# Patient Record
Sex: FEMALE | Race: WHITE | Employment: UNEMPLOYED | URBAN - METROPOLITAN AREA
[De-identification: names, ages, dates, MRNs, and addresses within clinical notes are randomized per-mention and may not be internally consistent; named-entity substitution may affect disease eponyms.]

---

## 2017-06-02 DIAGNOSIS — F41.9 ANXIETY: ICD-10-CM

## 2017-06-02 DIAGNOSIS — E78.2 MIXED HYPERLIPIDEMIA: ICD-10-CM

## 2017-06-02 DIAGNOSIS — I10 ESSENTIAL HYPERTENSION: ICD-10-CM

## 2017-06-02 RX ORDER — LORAZEPAM 0.5 MG/1
0.5 TABLET ORAL NIGHTLY
COMMUNITY
End: 2017-08-14 | Stop reason: SDUPTHER

## 2017-06-02 RX ORDER — NITROGLYCERIN 0.4 MG/1
0.4 TABLET SUBLINGUAL EVERY 5 MIN PRN
COMMUNITY
End: 2017-10-10 | Stop reason: SDUPTHER

## 2017-06-02 RX ORDER — ATORVASTATIN CALCIUM 10 MG/1
10 TABLET, FILM COATED ORAL NIGHTLY
COMMUNITY
End: 2017-10-10 | Stop reason: SDUPTHER

## 2017-06-02 RX ORDER — NEBIVOLOL 10 MG/1
10 TABLET ORAL EVERY EVENING
COMMUNITY
End: 2017-10-10 | Stop reason: SDUPTHER

## 2017-06-02 RX ORDER — LISINOPRIL 40 MG/1
40 TABLET ORAL DAILY
COMMUNITY
End: 2017-09-30 | Stop reason: DRUGHIGH

## 2017-06-02 RX ORDER — LEVOTHYROXINE SODIUM 0.03 MG/1
TABLET ORAL
COMMUNITY
End: 2017-10-10 | Stop reason: SDUPTHER

## 2017-06-02 RX ORDER — DONEPEZIL HYDROCHLORIDE 5 MG/1
5 TABLET, FILM COATED ORAL NIGHTLY
COMMUNITY
End: 2017-10-10 | Stop reason: SDUPTHER

## 2017-06-02 RX ORDER — MEMANTINE HYDROCHLORIDE 14 MG/1
14 CAPSULE, EXTENDED RELEASE ORAL EVERY EVENING
COMMUNITY
End: 2017-10-10 | Stop reason: SDUPTHER

## 2017-06-02 RX ORDER — AMLODIPINE BESYLATE 5 MG/1
5 TABLET ORAL 2 TIMES DAILY
COMMUNITY
End: 2017-10-10 | Stop reason: SDUPTHER

## 2017-06-02 RX ORDER — MULTIVITAMIN
1 TABLET ORAL DAILY
COMMUNITY
End: 2017-10-10 | Stop reason: SDUPTHER

## 2017-06-03 ENCOUNTER — OFFICE VISIT (OUTPATIENT)
Dept: FAMILY MEDICINE CLINIC | Age: 82
End: 2017-06-03

## 2017-06-03 VITALS
RESPIRATION RATE: 16 BRPM | HEART RATE: 80 BPM | BODY MASS INDEX: 23.3 KG/M2 | DIASTOLIC BLOOD PRESSURE: 68 MMHG | WEIGHT: 108 LBS | TEMPERATURE: 98.7 F | SYSTOLIC BLOOD PRESSURE: 120 MMHG | HEIGHT: 57 IN

## 2017-06-03 DIAGNOSIS — M79.605 LEFT LEG PAIN: ICD-10-CM

## 2017-06-03 DIAGNOSIS — G89.29 CHRONIC PAIN OF BOTH KNEES: ICD-10-CM

## 2017-06-03 DIAGNOSIS — F02.80 LATE ONSET ALZHEIMER'S DISEASE WITHOUT BEHAVIORAL DISTURBANCE (HCC): ICD-10-CM

## 2017-06-03 DIAGNOSIS — E78.2 MIXED HYPERLIPIDEMIA: ICD-10-CM

## 2017-06-03 DIAGNOSIS — Z91.81 AT HIGH RISK FOR FALLS: ICD-10-CM

## 2017-06-03 DIAGNOSIS — I25.10 CORONARY ARTERY DISEASE INVOLVING NATIVE CORONARY ARTERY OF NATIVE HEART WITHOUT ANGINA PECTORIS: ICD-10-CM

## 2017-06-03 DIAGNOSIS — M25.561 CHRONIC PAIN OF BOTH KNEES: ICD-10-CM

## 2017-06-03 DIAGNOSIS — G30.1 LATE ONSET ALZHEIMER'S DISEASE WITHOUT BEHAVIORAL DISTURBANCE (HCC): ICD-10-CM

## 2017-06-03 DIAGNOSIS — Z13.31 POSITIVE DEPRESSION SCREENING: ICD-10-CM

## 2017-06-03 DIAGNOSIS — F41.1 GENERALIZED ANXIETY DISORDER: ICD-10-CM

## 2017-06-03 DIAGNOSIS — I10 ESSENTIAL HYPERTENSION: Primary | ICD-10-CM

## 2017-06-03 DIAGNOSIS — E03.9 ACQUIRED HYPOTHYROIDISM: ICD-10-CM

## 2017-06-03 DIAGNOSIS — M25.562 CHRONIC PAIN OF BOTH KNEES: ICD-10-CM

## 2017-06-03 PROCEDURE — 99204 OFFICE O/P NEW MOD 45 MIN: CPT | Performed by: FAMILY MEDICINE

## 2017-06-03 PROCEDURE — 3288F FALL RISK ASSESSMENT DOCD: CPT | Performed by: FAMILY MEDICINE

## 2017-06-03 PROCEDURE — G8431 POS CLIN DEPRES SCRN F/U DOC: HCPCS | Performed by: FAMILY MEDICINE

## 2017-06-03 ASSESSMENT — PATIENT HEALTH QUESTIONNAIRE - PHQ9
3. TROUBLE FALLING OR STAYING ASLEEP: 0
SUM OF ALL RESPONSES TO PHQ9 QUESTIONS 1 & 2: 4
1. LITTLE INTEREST OR PLEASURE IN DOING THINGS: 2
10. IF YOU CHECKED OFF ANY PROBLEMS, HOW DIFFICULT HAVE THESE PROBLEMS MADE IT FOR YOU TO DO YOUR WORK, TAKE CARE OF THINGS AT HOME, OR GET ALONG WITH OTHER PEOPLE: 0
5. POOR APPETITE OR OVEREATING: 0
6. FEELING BAD ABOUT YOURSELF - OR THAT YOU ARE A FAILURE OR HAVE LET YOURSELF OR YOUR FAMILY DOWN: 1
7. TROUBLE CONCENTRATING ON THINGS, SUCH AS READING THE NEWSPAPER OR WATCHING TELEVISION: 1
8. MOVING OR SPEAKING SO SLOWLY THAT OTHER PEOPLE COULD HAVE NOTICED. OR THE OPPOSITE, BEING SO FIGETY OR RESTLESS THAT YOU HAVE BEEN MOVING AROUND A LOT MORE THAN USUAL: 0
2. FEELING DOWN, DEPRESSED OR HOPELESS: 2
SUM OF ALL RESPONSES TO PHQ QUESTIONS 1-9: 8
4. FEELING TIRED OR HAVING LITTLE ENERGY: 2
9. THOUGHTS THAT YOU WOULD BE BETTER OFF DEAD, OR OF HURTING YOURSELF: 0

## 2017-06-18 LAB
BACTERIA: ABNORMAL /HPF
BILIRUBIN URINE: NEGATIVE
BLOOD, URINE: ABNORMAL
CLARITY: CLEAR
COLOR: YELLOW
CRYSTALS, UA: ABNORMAL
EPITHELIAL CELLS, UA: ABNORMAL /HPF
GLUCOSE URINE: NEGATIVE MG/DL
KETONES, URINE: NEGATIVE MG/DL
LEUKOCYTE ESTERASE, URINE: NEGATIVE
MUCUS: PRESENT
NITRITE, URINE: NEGATIVE
PH UA: 6 (ref 5–9)
PROTEIN UA: >=300 MG/DL
RBC UA: ABNORMAL /HPF (ref 0–2)
SPECIFIC GRAVITY UA: 1.03 (ref 1–1.03)
UROBILINOGEN, URINE: 1 E.U./DL
WBC UA: ABNORMAL /HPF (ref 0–5)

## 2017-06-19 DIAGNOSIS — R80.9 PROTEINURIA: ICD-10-CM

## 2017-06-19 DIAGNOSIS — R31.9 HEMATURIA: Primary | ICD-10-CM

## 2017-06-19 LAB — URINE CULTURE, ROUTINE: NORMAL

## 2017-06-23 DIAGNOSIS — R31.9 HEMATURIA: ICD-10-CM

## 2017-06-23 DIAGNOSIS — R80.9 PROTEINURIA: ICD-10-CM

## 2017-07-26 ENCOUNTER — TELEPHONE (OUTPATIENT)
Dept: FAMILY MEDICINE CLINIC | Age: 82
End: 2017-07-26

## 2017-08-14 RX ORDER — LORAZEPAM 0.5 MG/1
0.5 TABLET ORAL NIGHTLY PRN
Qty: 90 TABLET | Refills: 0 | Status: SHIPPED | OUTPATIENT
Start: 2017-08-14 | End: 2017-10-10 | Stop reason: SDUPTHER

## 2017-08-23 ENCOUNTER — TELEPHONE (OUTPATIENT)
Dept: FAMILY MEDICINE CLINIC | Age: 82
End: 2017-08-23

## 2017-09-19 LAB
BASOPHILS ABSOLUTE: 0 K/UL (ref 0–0.2)
BASOPHILS ABSOLUTE: NORMAL /ΜL
BASOPHILS RELATIVE PERCENT: 0.5 %
BASOPHILS RELATIVE PERCENT: NORMAL %
EOSINOPHILS ABSOLUTE: 0.2 K/UL (ref 0–0.7)
EOSINOPHILS ABSOLUTE: NORMAL /ΜL
EOSINOPHILS RELATIVE PERCENT: 3.3 %
EOSINOPHILS RELATIVE PERCENT: NORMAL %
HCT VFR BLD CALC: 37.5 % (ref 37–47)
HCT VFR BLD CALC: NORMAL % (ref 36–46)
HEMOGLOBIN: 12.1 G/DL (ref 12–16)
HEMOGLOBIN: NORMAL G/DL (ref 12–16)
LYMPHOCYTES ABSOLUTE: 1.8 K/UL (ref 1–4.8)
LYMPHOCYTES ABSOLUTE: NORMAL /ΜL
LYMPHOCYTES RELATIVE PERCENT: 29.9 %
LYMPHOCYTES RELATIVE PERCENT: NORMAL %
MCH RBC QN AUTO: 29.2 PG (ref 27–31.3)
MCH RBC QN AUTO: NORMAL PG
MCHC RBC AUTO-ENTMCNC: 32.2 % (ref 33–37)
MCHC RBC AUTO-ENTMCNC: NORMAL G/DL
MCV RBC AUTO: 90.6 FL (ref 82–100)
MCV RBC AUTO: NORMAL FL
MONOCYTES ABSOLUTE: 0.6 K/UL (ref 0.2–0.8)
MONOCYTES ABSOLUTE: NORMAL /ΜL
MONOCYTES RELATIVE PERCENT: 10.1 %
MONOCYTES RELATIVE PERCENT: NORMAL %
NEUTROPHILS ABSOLUTE: 3.4 K/UL (ref 1.4–6.5)
NEUTROPHILS ABSOLUTE: NORMAL /ΜL
NEUTROPHILS RELATIVE PERCENT: 56.2 %
NEUTROPHILS RELATIVE PERCENT: NORMAL %
PDW BLD-RTO: 15.1 % (ref 11.5–14.5)
PDW BLD-RTO: NORMAL %
PLATELET # BLD: 268 K/UL (ref 130–400)
PLATELET # BLD: NORMAL K/ΜL
PMV BLD AUTO: NORMAL FL
RBC # BLD: 4.14 M/UL (ref 4.2–5.4)
RBC # BLD: NORMAL 10^6/ΜL
WBC # BLD: 6 K/UL (ref 4.8–10.8)
WBC # BLD: NORMAL 10^3/ML

## 2017-09-27 ENCOUNTER — TELEPHONE (OUTPATIENT)
Dept: FAMILY MEDICINE CLINIC | Age: 82
End: 2017-09-27

## 2017-09-30 ENCOUNTER — OFFICE VISIT (OUTPATIENT)
Dept: FAMILY MEDICINE CLINIC | Age: 82
End: 2017-09-30

## 2017-09-30 DIAGNOSIS — Z23 NEED FOR PNEUMOCOCCAL VACCINATION: ICD-10-CM

## 2017-09-30 DIAGNOSIS — G30.1 LATE ONSET ALZHEIMER'S DISEASE WITHOUT BEHAVIORAL DISTURBANCE (HCC): ICD-10-CM

## 2017-09-30 DIAGNOSIS — I10 ESSENTIAL HYPERTENSION: Primary | ICD-10-CM

## 2017-09-30 DIAGNOSIS — E78.2 MIXED HYPERLIPIDEMIA: ICD-10-CM

## 2017-09-30 DIAGNOSIS — Z23 NEED FOR INFLUENZA VACCINATION: ICD-10-CM

## 2017-09-30 DIAGNOSIS — E03.9 ACQUIRED HYPOTHYROIDISM: ICD-10-CM

## 2017-09-30 DIAGNOSIS — F02.80 LATE ONSET ALZHEIMER'S DISEASE WITHOUT BEHAVIORAL DISTURBANCE (HCC): ICD-10-CM

## 2017-09-30 PROCEDURE — 90670 PCV13 VACCINE IM: CPT | Performed by: FAMILY MEDICINE

## 2017-09-30 PROCEDURE — G0009 ADMIN PNEUMOCOCCAL VACCINE: HCPCS | Performed by: FAMILY MEDICINE

## 2017-09-30 PROCEDURE — G0008 ADMIN INFLUENZA VIRUS VAC: HCPCS | Performed by: FAMILY MEDICINE

## 2017-09-30 PROCEDURE — 90662 IIV NO PRSV INCREASED AG IM: CPT | Performed by: FAMILY MEDICINE

## 2017-09-30 PROCEDURE — 99214 OFFICE O/P EST MOD 30 MIN: CPT | Performed by: FAMILY MEDICINE

## 2017-09-30 RX ORDER — LISINOPRIL 20 MG/1
20 TABLET ORAL DAILY
Qty: 90 TABLET | Refills: 3 | Status: SHIPPED | OUTPATIENT
Start: 2017-09-30 | End: 2017-10-10 | Stop reason: SDUPTHER

## 2017-09-30 ASSESSMENT — PATIENT HEALTH QUESTIONNAIRE - PHQ9
SUM OF ALL RESPONSES TO PHQ9 QUESTIONS 1 & 2: 0
1. LITTLE INTEREST OR PLEASURE IN DOING THINGS: 0
2. FEELING DOWN, DEPRESSED OR HOPELESS: 0
SUM OF ALL RESPONSES TO PHQ QUESTIONS 1-9: 0

## 2017-09-30 NOTE — PROGRESS NOTES
Subjective:       Chief Complaint   Patient presents with    Follow-up     htn, hyperlipidemia, and other medical conditions      Kati Montana is a 80 y.o. female who presents today for FU of Follow-up (htn, hyperlipidemia, and other medical conditions)    She presents for follow up on hypertension,  Hyperlipidemia,Hypothyroidism and other medical conditions noted below. She indicates that she is feeling well and denies any symptoms referable to her elevated blood pressure. Specifically denies chest pain, palpitations, dyspnea, orthopnea, PND or peripheral edema. No anorexia, arthralgia, or leg cramps noted. Current medication regimen is as listed below. She denies any side effects of medication, and has been taking it regularly.       Past Medical History:   Diagnosis Date    Alzheimer's dementia     Anxiety     CAD (coronary artery disease)     Dementia     Hyperlipidemia     Hypertension     Hypothyroidism     Melanoma ([facility] [address]     skin    Osteoarthritis      Patient Active Problem List    Diagnosis Date Noted    Late onset Alzheimer's disease without behavioral disturbance 06/03/2017    Generalized anxiety disorder 06/03/2017    Hypothyroidism     Osteoarthritis     CAD (coronary artery disease)     Hyperlipidemia     Hypertension      Past Surgical History:   Procedure Laterality Date    BACK SURGERY      BREAST SURGERY      biopsy    CORONARY ANGIOPLASTY WITH STENT PLACEMENT      HYSTERECTOMY  1960     Family History   Problem Relation Age of Onset    Cancer Brother      Social History     Social History    Marital status:      Spouse name: N/A    Number of children: N/A    Years of education: N/A     Social History Main Topics    Smoking status: Never Smoker    Smokeless tobacco: Never Used    Alcohol use No    Drug use: No    Sexual activity: Not Asked     Other Topics Concern    None     Social History Narrative     Current Outpatient Prescriptions   Medication Sig (CALTRATE 600 PLUS-VIT D PO) Take 1 tablet by mouth daily      memantine ER (NAMENDA XR) 14 MG CP24 extended release capsule Take 14 mg by mouth every evening      nebivolol (BYSTOLIC) 10 MG tablet Take 10 mg by mouth every evening      aspirin 81 MG tablet Take 81 mg by mouth daily      atorvastatin (LIPITOR) 10 MG tablet Take 10 mg by mouth nightly      donepezil (ARICEPT) 5 MG tablet Take 5 mg by mouth nightly      nitroGLYCERIN (NITROSTAT) 0.4 MG SL tablet Place 0.4 mg under the tongue every 5 minutes as needed for Chest pain up to max of 3 total doses. If no relief after 1 dose, call 911.  Calcium Carbonate Antacid (TUMS ULTRA 1000) 1000 MG CHEW Take 2 tablets by mouth as needed       No current facility-administered medications on file prior to visit.       Allergies   Allergen Reactions    Amoxil [Amoxicillin]     Calcium Citrate     Noroxin [Norfloxacin]     Tiazac [Diltiazem]           Review of Systems - General ROS: negative for - fatigue, fever, malaise, night sweats, sleep disturbance or weight loss  Psychological ROS: negative for - anxiety, concentration difficulties, depression, memory difficulties or sleep disturbances  ENT ROS: negative for - hearing change, nasal discharge, oral lesions, sinus pain, sore throat, tinnitus or vertigo  Allergy and Immunology ROS: negative for - hives, nasal congestion or seasonal allergies  Hematological and Lymphatic ROS: negative for - bruising, fatigue, night sweats or pallor  Endocrine ROS: negative for - hot flashes, malaise/lethargy, palpitations, polydipsia/polyuria, skin changes, temperature intolerance or unexpected weight changes  Respiratory ROS: negative for - cough, hemoptysis, pleuritic pain, shortness of breath or wheezing  Cardiovascular ROS: no chest pain or dyspnea on exertion  Gastrointestinal ROS: no abdominal pain, change in bowel habits, or black or bloody stools  Genito-Urinary ROS: no dysuria, trouble voiding, or hematuria  Musculoskeletal ROS: negative for - joint pain, joint stiffness, joint swelling, muscle pain or muscular weakness  Neurological ROS: negative for - dizziness, gait disturbance, headaches, impaired coordination/balance, numbness/tingling, tremors or visual changes  Dermatological ROS: negative for - dry skin, lumps, pruritus or rash        Objective:   Blood pressure 122/62, pulse 62, temperature 97.2 °F (36.2 °C), temperature source Temporal, resp. rate 14, height 4' 8.75\" (1.441 m), weight 102 lb 9.6 oz (46.5 kg). Physical Examination: General appearance - alert, well appearing, and in no distress  Mental status - alert, oriented to person, place, and time  Eyes - pupils equal and reactive, extraocular eye movements intact  Ears - bilateral TM's and external ear canals normal  Mouth - mucous membranes moist, pharynx normal without lesions  Neck - supple, no significant adenopathy  Lymphatics - no palpable lymphadenopathy, no hepatosplenomegaly  Chest - clear to auscultation, no wheezes, rales or rhonchi, symmetric air entry  Heart - normal rate, regular rhythm, normal S1, S2, no murmurs, rubs, clicks or gallops  Abdomen - soft, nontender, nondistended, no masses or organomegaly  Neurological - alert, oriented, normal speech, no focal findings or movement disorder noted  Musculoskeletal - no joint tenderness, deformity or swelling  Extremities: peripheral pulses normal, no pedal edema, no clubbing or cyanosis. Assessment / Plan:     Encounter Diagnoses   Name Primary?     Mixed hyperlipidemia     Essential hypertension Yes    Acquired hypothyroidism     Need for pneumococcal vaccination     Need for influenza vaccination     Late onset Alzheimer's disease without behavioral disturbance        Orders Placed This Encounter   Procedures    INFLUENZA, HIGH DOSE, 65 YRS +, IM, PF, PREFILL SYR, 0.5ML (FLUZONE HD)    Pneumococcal conjugate vaccine 13-valent IM (PREVNAR 13)    Lipid Panel Standing Status:   Future     Standing Expiration Date:   10/1/2018     Order Specific Question:   Is Patient Fasting?/# of Hours     Answer:   8-10    Comprehensive Metabolic Panel     Standing Status:   Future     Standing Expiration Date:   10/1/2018    T4, Free     Standing Status:   Future     Standing Expiration Date:   10/1/2018    TSH without Reflex     Standing Status:   Future     Standing Expiration Date:   9/30/2018       Outpatient Encounter Prescriptions as of 9/30/2017   Medication Sig Dispense Refill    lisinopril (PRINIVIL;ZESTRIL) 20 MG tablet Take 1 tablet by mouth daily 90 tablet 3    LORazepam (ATIVAN) 0.5 MG tablet Take 1 tablet by mouth nightly as needed for Anxiety 90 tablet 0    levothyroxine (SYNTHROID) 25 MCG tablet Take 1 tab by mouth Monday, and 1/2 tab on Sun, Tues, Weds, Thurs, Fri and Saturday      amLODIPine (NORVASC) 5 MG tablet Take 5 mg by mouth 2 times daily      Multiple Vitamin (MULTIVITAMIN) tablet Take 1 tablet by mouth daily      vitamin D (CHOLECALCIFEROL) 1000 UNIT TABS tablet Take 1,000 Units by mouth daily      Calcium-Vitamin D (CALTRATE 600 PLUS-VIT D PO) Take 1 tablet by mouth daily      memantine ER (NAMENDA XR) 14 MG CP24 extended release capsule Take 14 mg by mouth every evening      nebivolol (BYSTOLIC) 10 MG tablet Take 10 mg by mouth every evening      aspirin 81 MG tablet Take 81 mg by mouth daily      atorvastatin (LIPITOR) 10 MG tablet Take 10 mg by mouth nightly      donepezil (ARICEPT) 5 MG tablet Take 5 mg by mouth nightly      nitroGLYCERIN (NITROSTAT) 0.4 MG SL tablet Place 0.4 mg under the tongue every 5 minutes as needed for Chest pain up to max of 3 total doses. If no relief after 1 dose, call 911.       Calcium Carbonate Antacid (TUMS ULTRA 1000) 1000 MG CHEW Take 2 tablets by mouth as needed      [DISCONTINUED] lisinopril (PRINIVIL;ZESTRIL) 40 MG tablet Take 40 mg by mouth daily       No facility-administered encounter medications

## 2017-09-30 NOTE — PATIENT INSTRUCTIONS
High Cholesterol: Care Instructions  Your Care Instructions  Cholesterol is a type of fat in your blood. It is needed for many body functions, such as making new cells. Cholesterol is made by your body. It also comes from food you eat. High cholesterol means that you have too much of the fat in your blood. This raises your risk of a heart attack and stroke. LDL and HDL are part of your total cholesterol. LDL is the \"bad\" cholesterol. High LDL can raise your risk for heart disease, heart attack, and stroke. HDL is the \"good\" cholesterol. It helps clear bad cholesterol from the body. High HDL is linked with a lower risk of heart disease, heart attack, and stroke. Your cholesterol levels help your doctor find out your risk for having a heart attack or stroke. You and your doctor can talk about whether you need to lower your risk and what treatment is best for you. A heart-healthy lifestyle along with medicines can help lower your cholesterol and your risk. The way you choose to lower your risk will depend on how high your risk is for heart attack and stroke. It will also depend on how you feel about taking medicines. Follow-up care is a key part of your treatment and safety. Be sure to make and go to all appointments, and call your doctor if you are having problems. It's also a good idea to know your test results and keep a list of the medicines you take. How can you care for yourself at home? · Eat a variety of foods every day. Good choices include fruits, vegetables, whole grains (like oatmeal), dried beans and peas, nuts and seeds, soy products (like tofu), and fat-free or low-fat dairy products. · Replace butter, margarine, and hydrogenated or partially hydrogenated oils with olive and canola oils. (Canola oil margarine without trans fat is fine.)  · Replace red meat with fish, poultry, and soy protein (like tofu). · Limit processed and packaged foods like chips, crackers, and cookies.   · Bake, broil,

## 2017-09-30 NOTE — MR AVS SNAPSHOT
After Visit Summary             Unique Antoine   2017 9:30 AM   Office Visit    Description:  Female : 1931   Provider:  Jd Betancur MD   Department:  [facility]              Your Follow-Up and Future Appointments         Below is a list of your follow-up and future appointments. This may not be a complete list as you may have made appointments directly with providers that we are not aware of or your providers may have made some for you. Please call your providers to confirm appointments. It is important to keep your appointments. Please bring your current insurance card, photo ID, co-pay, and all medication bottles to your appointment. If self-pay, payment is expected at the time of service. Your To-Do List     Future Orders Complete By Expires    Comprehensive Metabolic Panel [TUT94 Custom]  2017 (Approximate) 10/1/2018    Lipid Panel [LAB18 Custom]  2017 (Approximate) 10/1/2018    T4, Free [BVZ285 Custom]  2017 (Approximate) 10/1/2018    TSH without Reflex [LWR109 Custom]  2017 (Approximate) 2018         Information from Your Visit        Department     Name Address Phone Fax    [facility] [address] [place] [address]. [place]enda [place] [address] [phone]      You Were Seen for:         Comments    Essential hypertension   [658342]         Vital Signs     Blood Pressure Pulse Temperature Respirations Height Weight    122/52 (Site: Right Arm, Position: Sitting, Cuff Size: Medium Adult) 62 97.2 °F (36.2 °C) (Temporal) 14 4' 8.75\" (1.441 m) 102 lb 9.6 oz (46.5 kg)    Body Mass Index Smoking Status                22.4 kg/m2 Never Smoker          Instructions         High Cholesterol: Care Instructions  Your Care Instructions  Cholesterol is a type of fat in your blood. It is needed for many body functions, such as making new cells. Cholesterol is made by your body. It also comes from food you eat.  High cholesterol means that you have too much of the fat in your blood. This raises your risk of a heart attack and stroke. LDL and HDL are part of your total cholesterol. LDL is the \"bad\" cholesterol. High LDL can raise your risk for heart disease, heart attack, and stroke. HDL is the \"good\" cholesterol. It helps clear bad cholesterol from the body. High HDL is linked with a lower risk of heart disease, heart attack, and stroke. Your cholesterol levels help your doctor find out your risk for having a heart attack or stroke. You and your doctor can talk about whether you need to lower your risk and what treatment is best for you. A heart-healthy lifestyle along with medicines can help lower your cholesterol and your risk. The way you choose to lower your risk will depend on how high your risk is for heart attack and stroke. It will also depend on how you feel about taking medicines. Follow-up care is a key part of your treatment and safety. Be sure to make and go to all appointments, and call your doctor if you are having problems. It's also a good idea to know your test results and keep a list of the medicines you take. How can you care for yourself at home? · Eat a variety of foods every day. Good choices include fruits, vegetables, whole grains (like oatmeal), dried beans and peas, nuts and seeds, soy products (like tofu), and fat-free or low-fat dairy products. · Replace butter, margarine, and hydrogenated or partially hydrogenated oils with olive and canola oils. (Canola oil margarine without trans fat is fine.)  · Replace red meat with fish, poultry, and soy protein (like tofu). · Limit processed and packaged foods like chips, crackers, and cookies. · Bake, broil, or steam foods. Don't sun them. · Be physically active. Get at least 30 minutes of exercise on most days of the week. Walking is a good choice. You also may want to do other activities, such as running, swimming, cycling, or playing tennis or team sports. · Stay at a healthy weight or lose weight by making the changes in eating and physical activity listed above. Losing just a small amount of weight, even 5 to 10 pounds, can reduce your risk for having a heart attack or stroke. · Do not smoke. When should you call for help? Watch closely for changes in your health, and be sure to contact your doctor if:  · You need help making lifestyle changes. · You have questions about your medicine. Where can you learn more? Go to https://[email or web address]. org and sign in to your [employer] account. Enter T908 in the Feedback box to learn more about \"High Cholesterol: Care Instructions. \"     If you do not have an account, please click on the \"Sign Up Now\" link. Current as of: April 3, 2017  Content Version: 11.3  © 9974-8567 [employer]. Care instructions adapted under license by [facility] ([facility]). If you have questions about a medical condition or this instruction, always ask your healthcare professional. [place] [address] any warranty or liability for your use of this information. Today's Medication Changes          These changes are accurate as of: 9/30/17  9:58 AM.  If you have any questions, ask your nurse or doctor. START taking these medications           lisinopril 20 MG tablet   Commonly known as:  PRINIVIL;ZESTRIL   Instructions:   Take 1 tablet by mouth daily   Quantity:  90 tablet   Refills:  3   Started by:  Ruth Santiago MD            Where to Get Your Medications      These medications were sent to [address] [place] [address] [address], [address] [address]     Phone:  [phone]     lisinopril 20 MG tablet               Your Current Medications Are              lisinopril (PRINIVIL;ZESTRIL) 20 MG tablet Take 1 tablet by mouth daily Pneumococcal Vaccines (two) for all adults aged 72 and over (1 of 2 - PCV13) 2/9/1996    Yearly Flu Vaccine (1) 9/1/2017    Zoster Vaccine 9/30/2018 (Originally 2/9/1991)    Tetanus Combination Vaccine (1 - Tdap) 9/30/2018 (Originally 2[birth year]9/1950)            [address] [place] [address] records indicate that you have declined MyChart signup.

## 2017-10-01 VITALS
BODY MASS INDEX: 22.14 KG/M2 | HEART RATE: 62 BPM | TEMPERATURE: 97.2 F | RESPIRATION RATE: 14 BRPM | HEIGHT: 57 IN | WEIGHT: 102.6 LBS | DIASTOLIC BLOOD PRESSURE: 62 MMHG | SYSTOLIC BLOOD PRESSURE: 122 MMHG

## 2017-10-10 RX ORDER — MEMANTINE HYDROCHLORIDE 14 MG/1
14 CAPSULE, EXTENDED RELEASE ORAL DAILY
COMMUNITY

## 2017-10-10 RX ORDER — DONEPEZIL HYDROCHLORIDE 5 MG/1
5 TABLET, FILM COATED ORAL NIGHTLY
COMMUNITY

## 2017-10-10 RX ORDER — ATORVASTATIN CALCIUM 10 MG/1
10 TABLET, FILM COATED ORAL DAILY
COMMUNITY

## 2017-10-10 RX ORDER — MULTIVIT WITH MINERALS/LUTEIN
1 TABLET ORAL DAILY
COMMUNITY

## 2017-10-10 RX ORDER — NITROGLYCERIN 0.4 MG/1
0.4 TABLET SUBLINGUAL EVERY 5 MIN PRN
COMMUNITY

## 2017-10-10 RX ORDER — AMLODIPINE BESYLATE 5 MG/1
5 TABLET ORAL DAILY
COMMUNITY
End: 2022-11-01 | Stop reason: SDUPTHER

## 2017-10-10 RX ORDER — ACETAMINOPHEN 325 MG/1
650 TABLET ORAL DAILY PRN
COMMUNITY
End: 2023-04-17

## 2017-10-10 RX ORDER — ACETAMINOPHEN 325 MG/1
650 TABLET ORAL 3 TIMES DAILY
COMMUNITY

## 2017-10-10 RX ORDER — NEBIVOLOL 10 MG/1
10 TABLET ORAL DAILY
COMMUNITY
End: 2022-10-11 | Stop reason: SDUPTHER

## 2017-10-10 RX ORDER — LEVOTHYROXINE SODIUM 0.03 MG/1
25 TABLET ORAL SEE ADMIN INSTRUCTIONS
COMMUNITY

## 2017-10-10 RX ORDER — LISINOPRIL 40 MG/1
20 TABLET ORAL DAILY
COMMUNITY
End: 2022-11-01 | Stop reason: SDUPTHER

## 2017-10-10 RX ORDER — MELATONIN
2000 DAILY
COMMUNITY

## 2018-04-04 ENCOUNTER — OFFICE VISIT (OUTPATIENT)
Dept: GERIATRIC MEDICINE | Age: 83
End: 2018-04-04
Payer: MEDICARE

## 2018-04-04 DIAGNOSIS — E03.9 HYPOTHYROIDISM, UNSPECIFIED TYPE: ICD-10-CM

## 2018-04-04 DIAGNOSIS — I10 ESSENTIAL HYPERTENSION: Primary | ICD-10-CM

## 2018-04-04 DIAGNOSIS — G31.84 MCI (MILD COGNITIVE IMPAIRMENT): ICD-10-CM

## 2018-04-23 VITALS — HEART RATE: 52 BPM | TEMPERATURE: 96 F | SYSTOLIC BLOOD PRESSURE: 122 MMHG | DIASTOLIC BLOOD PRESSURE: 60 MMHG

## 2018-07-12 ENCOUNTER — OFFICE VISIT (OUTPATIENT)
Dept: GERIATRIC MEDICINE | Age: 83
End: 2018-07-12
Payer: MEDICARE

## 2018-07-12 DIAGNOSIS — F03.90 DEMENTIA WITHOUT BEHAVIORAL DISTURBANCE, UNSPECIFIED DEMENTIA TYPE: Primary | ICD-10-CM

## 2018-07-12 DIAGNOSIS — E03.9 HYPOTHYROIDISM, UNSPECIFIED TYPE: ICD-10-CM

## 2018-07-12 DIAGNOSIS — I10 HYPERTENSION, UNSPECIFIED TYPE: ICD-10-CM

## 2018-07-12 DIAGNOSIS — M15.9 PRIMARY OSTEOARTHRITIS INVOLVING MULTIPLE JOINTS: ICD-10-CM

## 2018-07-25 NOTE — PROGRESS NOTES
PATIENT: Gladis Turner : 1931 DOS: 2018     [facility]    CHIEF COMPLAINT:  Cognitive impairment/dementia, degenerative joint disease, hypothyroidism. HISTORY OF PRESENT ILLNESS:  This is an 80-year-old woman seen with her . The patient has had ongoing slow cognitive decline, increasing agitation as high as increasing confusion, decreased ability to function independently completely as she has been. The patient has had falls in the last year, otherwise she is clinically stable. PAST MEDICAL HISTORY:  Included dementia/MCI, hypothyroidism, hypertension, degenerative joint disease, weakness, confusion, agitation. MEDICATIONS:  Her medications at this time included Norvasc 5 mg daily, aspirin 81 mg daily, atorvastatin 10 mg daily, Bystolic 10 mg daily, donepezil 5 mg daily, Synthroid 25 mcg daily, lisinopril 20 mg daily, memantine 40 mg daily, lorazepam 0.5 mg daily. FAMILY HISTORY:  Positive for coronary artery disease, diabetes. SOCIAL HISTORY:  The patient is currently nonsmoker, nondrinker. REVIEW OF SYSTEMS:  The patient denied chest pain, palpitations, nausea, vomiting, emesis. Denies headaches. Denies bleeding diathesis. Rest of her 14-point review of systems was unremarkable. PHYSICAL EXAMINATION:  The patient's vital signs were stable. She was afebrile 97.6, pulse was 62, blood pressure was 110/58, she is 97% on room air. She is alert and oriented x1, not to self. She is normocephalic, atraumatic. Pupils are small, but they are reactive. Oral mucosa is moist.  Chest showed diminished breath sounds. No crackles, no wheezing. Cardiovascular exam showed a regular rate. Abdomen was soft, nontender. Extremities showed trace dorsal pedal pulse. ASSESSMENT AND PLAN:  1. Dementia:  The patient may benefit from titration of her medications. Additionally may be appropriate for transition towards [facility].   2. Hypothyroidism:  The patient remains on Synthroid. Next TSH in the next 6 months. 3.   Hypertension:  Blood pressure is stable. No orthostasis, is on ACE inhibitor, is on calcium channel blocker. Monitor renal function. 4.   Degenerative joint disease:  No new pain crisis. Monitor closely.           Electronically Signed By: Adele Julio M.D. on 07/22/2018 10:51:21  ______________________________  Adele Julio M.D.  [id number]  D: 07/12/2018 17:25:09  T: 07/13/2018 12:09:17    cc: [facility]

## 2018-08-01 ENCOUNTER — OFFICE VISIT (OUTPATIENT)
Dept: GERIATRIC MEDICINE | Age: 83
End: 2018-08-01
Payer: MEDICARE

## 2018-08-01 DIAGNOSIS — M70.22 OLECRANON BURSITIS OF LEFT ELBOW: Primary | ICD-10-CM

## 2018-08-27 NOTE — PROGRESS NOTES
PATIENT: Cathy Ibarra : 1931 DOS: 2018     [facility]    Seen for an acute visit for olecranon bursitis, shoulder, with a left elbow injury. SUBJECTIVE:  This 26-year-old woman was seen with nursing staff present. The patient was found this morning by nursing staff to have a large fluid collection at the base of her elbow. Some discomfort with palpation. No erythema. No fevers. The patient had been unaware of this, and so did nursing staff until this moment. REVIEW OF SYSTEMS:  Denied chest pain, palpitations, nausea, vomiting, no headaches, no limitation of range of motion of the left upper extremity, no radiating chest pain. OBJECTIVE:  She appears stable, alert, and oriented x1. Frail at her baseline. Pupils are small, but they are reactive. Oral mucosa is moist.  No thrush. Neck was supple. Chest showed no crackles. No wheezing. Cardiovascular exam showed a regular rate. The abdomen is soft, nontender. Extremities showed plus 1 dorsalis pedis pulse. Left upper extremity elbow showed an approximately 2 or 3-cm fluid collection at the base of the elbow, appears to be olecranon bursitis. No fluctuance. No erythema. Minimal discomfort with palpation. ASSESSMENT AND PLAN:  Left arm bursitis. We will start patient on a course of routine NSAID for approximately 2 weeks. Advised nursing staff to monitor for trauma. Monitor for any erythema or warmth and notify MD, NP if that occurs. We will follow closely.           Electronically Signed By: Cee Garay M.D. on 2018 18:18:46  ______________________________  Cee Garay M.D.  [id number]  D: 2018 24:86:28  T: 2018 91:77:40    cc: - [facility]

## 2018-12-05 ENCOUNTER — OFFICE VISIT (OUTPATIENT)
Dept: GERIATRIC MEDICINE | Age: 83
End: 2018-12-05
Payer: MEDICARE

## 2018-12-05 DIAGNOSIS — M15.9 OSTEOARTHRITIS OF MULTIPLE JOINTS, UNSPECIFIED OSTEOARTHRITIS TYPE: ICD-10-CM

## 2018-12-05 DIAGNOSIS — I10 ESSENTIAL HYPERTENSION: ICD-10-CM

## 2018-12-05 DIAGNOSIS — F03.90 DEMENTIA WITHOUT BEHAVIORAL DISTURBANCE, UNSPECIFIED DEMENTIA TYPE: Primary | ICD-10-CM

## 2018-12-05 PROCEDURE — 99308 SBSQ NF CARE LOW MDM 20: CPT | Performed by: INTERNAL MEDICINE

## 2018-12-09 LAB
ALBUMIN SERPL-MCNC: 3.6 G/DL (ref 3.9–4.9)
ALP BLD-CCNC: 73 U/L (ref 40–130)
ALT SERPL-CCNC: 14 U/L (ref 0–33)
ANION GAP SERPL CALCULATED.3IONS-SCNC: 8 MEQ/L (ref 7–13)
AST SERPL-CCNC: 16 U/L (ref 0–35)
BILIRUB SERPL-MCNC: 0.3 MG/DL (ref 0–1.2)
BUN BLDV-MCNC: 24 MG/DL (ref 8–23)
CALCIUM SERPL-MCNC: 9.4 MG/DL (ref 8.6–10.2)
CHLORIDE BLD-SCNC: 98 MEQ/L (ref 98–107)
CO2: 29 MEQ/L (ref 22–29)
CREAT SERPL-MCNC: 0.61 MG/DL (ref 0.5–0.9)
GFR AFRICAN AMERICAN: >60
GFR NON-AFRICAN AMERICAN: >60
GLOBULIN: 2.8 G/DL (ref 2.3–3.5)
GLUCOSE BLD-MCNC: 97 MG/DL (ref 74–109)
HCT VFR BLD CALC: 38.2 % (ref 37–47)
HEMOGLOBIN: 13 G/DL (ref 12–16)
MCH RBC QN AUTO: 32.7 PG (ref 27–31.3)
MCHC RBC AUTO-ENTMCNC: 34 % (ref 33–37)
MCV RBC AUTO: 96.2 FL (ref 82–100)
PDW BLD-RTO: 13.6 % (ref 11.5–14.5)
PLATELET # BLD: 206 K/UL (ref 130–400)
POTASSIUM SERPL-SCNC: 4.9 MEQ/L (ref 3.5–5.1)
RBC # BLD: 3.97 M/UL (ref 4.2–5.4)
SODIUM BLD-SCNC: 135 MEQ/L (ref 132–144)
TOTAL PROTEIN: 6.4 G/DL (ref 6.4–8.1)
WBC # BLD: 6.4 K/UL (ref 4.8–10.8)

## 2019-03-28 ENCOUNTER — OFFICE VISIT (OUTPATIENT)
Dept: GERIATRIC MEDICINE | Age: 84
End: 2019-03-28
Payer: MEDICARE

## 2019-03-28 DIAGNOSIS — I10 ESSENTIAL HYPERTENSION: ICD-10-CM

## 2019-03-28 DIAGNOSIS — E03.9 HYPOTHYROIDISM, UNSPECIFIED TYPE: Primary | ICD-10-CM

## 2019-03-28 DIAGNOSIS — F03.90 DEMENTIA WITHOUT BEHAVIORAL DISTURBANCE, UNSPECIFIED DEMENTIA TYPE: ICD-10-CM

## 2019-04-23 ENCOUNTER — TELEPHONE (OUTPATIENT)
Dept: PHARMACY | Facility: CLINIC | Age: 84
End: 2019-04-23

## 2019-04-23 NOTE — TELEPHONE ENCOUNTER
Identified care gap per [employer]: atorvastatin 10mg and lisinopril 20mg   Per records, appears 30-day supply last filled 02/05/2019     Per Shreyas: EILE[facility] jose [address]fred[address]: [phone]    Medication: atorvastatin 10mg and lisinopril 20mg   Last 3 fill dates: this medication is filled daily due to patient being in LTC    No patient outreach planned at this time. Patient is currently in [address] [place] [address], toll free: [phone], option 7    CLINICAL PHARMACY CONSULT: MED RECONCILIATION/REVIEW ADDENDUM    For Pharmacy Admin Tracking Only    PHSO: Yes  Total # of Interventions Recommended: 1  - New Order #: 0 New Medication Order Reason(s):  Adherence  - Maintenance Safety Lab Monitoring #: 1  - New Therapy Lab Monitoring #: 0  Recommended intervention potential cost savings: 0   Total Interventions Accepted: 0  Time Spent (min): [address]

## 2019-05-15 ENCOUNTER — OFFICE VISIT (OUTPATIENT)
Dept: GERIATRIC MEDICINE | Age: 84
End: 2019-05-15
Payer: MEDICARE

## 2019-05-15 DIAGNOSIS — R23.3 PETECHIAL RASH: Primary | ICD-10-CM

## 2019-06-01 PROBLEM — R23.3 PETECHIAL RASH: Status: ACTIVE | Noted: 2019-06-01

## 2019-07-25 ENCOUNTER — OFFICE VISIT (OUTPATIENT)
Dept: GERIATRIC MEDICINE | Age: 84
End: 2019-07-25
Payer: MEDICARE

## 2019-07-25 DIAGNOSIS — F03.90 DEMENTIA WITHOUT BEHAVIORAL DISTURBANCE, UNSPECIFIED DEMENTIA TYPE: Primary | ICD-10-CM

## 2019-07-25 DIAGNOSIS — E78.5 HYPERLIPIDEMIA, UNSPECIFIED HYPERLIPIDEMIA TYPE: ICD-10-CM

## 2019-07-25 DIAGNOSIS — K59.00 CONSTIPATION, UNSPECIFIED CONSTIPATION TYPE: ICD-10-CM

## 2020-01-08 ENCOUNTER — OFFICE VISIT (OUTPATIENT)
Dept: GERIATRIC MEDICINE | Age: 85
End: 2020-01-08
Payer: MEDICARE

## 2020-01-31 ENCOUNTER — OFFICE VISIT (OUTPATIENT)
Dept: GERIATRIC MEDICINE | Age: 85
End: 2020-01-31
Payer: MEDICARE

## 2020-01-31 LAB
BACTERIA: ABNORMAL /HPF
BILIRUBIN URINE: NEGATIVE
BLOOD, URINE: ABNORMAL
CASTS: ABNORMAL /LPF
CLARITY: ABNORMAL
COLOR: YELLOW
EPITHELIAL CELLS, UA: ABNORMAL /HPF (ref 0–5)
GLUCOSE URINE: NEGATIVE MG/DL
KETONES, URINE: ABNORMAL MG/DL
LEUKOCYTE ESTERASE, URINE: ABNORMAL
NITRITE, URINE: POSITIVE
PH UA: 5 (ref 5–9)
PROTEIN UA: >=300 MG/DL
RBC UA: ABNORMAL /HPF (ref 0–2)
SPECIFIC GRAVITY UA: 1.03 (ref 1–1.03)
UROBILINOGEN, URINE: 0.2 E.U./DL
WBC UA: >100 /HPF (ref 0–5)

## 2020-02-03 LAB
ORGANISM: ABNORMAL
URINE CULTURE, ROUTINE: ABNORMAL

## 2020-02-04 LAB
ANION GAP SERPL CALCULATED.3IONS-SCNC: 15 MEQ/L (ref 9–15)
BASOPHILS ABSOLUTE: 0 K/UL (ref 0–0.2)
BASOPHILS RELATIVE PERCENT: 0.7 %
BUN BLDV-MCNC: 40 MG/DL (ref 8–23)
CALCIUM SERPL-MCNC: 9.2 MG/DL (ref 8.5–9.9)
CHLORIDE BLD-SCNC: 101 MEQ/L (ref 95–107)
CO2: 21 MEQ/L (ref 20–31)
CREAT SERPL-MCNC: 0.79 MG/DL (ref 0.5–0.9)
EOSINOPHILS ABSOLUTE: 0.3 K/UL (ref 0–0.7)
EOSINOPHILS RELATIVE PERCENT: 3.5 %
GFR AFRICAN AMERICAN: >60
GFR NON-AFRICAN AMERICAN: >60
GLUCOSE BLD-MCNC: 91 MG/DL (ref 70–99)
HCT VFR BLD CALC: 37 % (ref 37–47)
HEMOGLOBIN: 12.3 G/DL (ref 12–16)
LYMPHOCYTES ABSOLUTE: 2.1 K/UL (ref 1–4.8)
LYMPHOCYTES RELATIVE PERCENT: 28.2 %
MCH RBC QN AUTO: 31.9 PG (ref 27–31.3)
MCHC RBC AUTO-ENTMCNC: 33.3 % (ref 33–37)
MCV RBC AUTO: 95.7 FL (ref 82–100)
MONOCYTES ABSOLUTE: 0.7 K/UL (ref 0.2–0.8)
MONOCYTES RELATIVE PERCENT: 9.5 %
NEUTROPHILS ABSOLUTE: 4.3 K/UL (ref 1.4–6.5)
NEUTROPHILS RELATIVE PERCENT: 58.1 %
PDW BLD-RTO: 14.5 % (ref 11.5–14.5)
PLATELET # BLD: 233 K/UL (ref 130–400)
POTASSIUM SERPL-SCNC: 5.5 MEQ/L (ref 3.4–4.9)
RBC # BLD: 3.87 M/UL (ref 4.2–5.4)
SODIUM BLD-SCNC: 137 MEQ/L (ref 135–144)
WBC # BLD: 7.3 K/UL (ref 4.8–10.8)

## 2020-02-05 NOTE — PROGRESS NOTES
PATIENT: Aleida Paredes : 1931 DOS: 2020     [facility]    Seen for acute visit for status post fall. SUBJECTIVE:  This 19-year-old woman was seated up in her room. The patient has been clinically stable. Reports that she suffered a fall earlier today. The patient has not had any  new emesis, fevers or chills, but does appear to be below her baseline in terms of functional status. The patient has not had any evidence of acute pain crisis. OBJECTIVE:  She appeared frail. Pupils are small, minimally reactive. Oral mucosa is moist.  Chest showed no crackles, no wheezing. Cardiovascular exam showed a regular rate. Abdomen is soft, nontender. Extremities showed trace dorsal pedal pulse. Power is 5 minus. ASSESSMENT AND PLAN:  Unsteadiness with fall: We will check UA, C&S. We will check basic blood work. May consider patient referral for therapy for safety evaluation. We will follow clinically.         Electronically Signed By: Fatimah Barroso M.D. on 2020 18:16:48  ______________________________  Fatimah Barroso M.D.  [id number]  D: 2020 14:11:52  T: 2020 04:02:29    cc: - [facility]

## 2020-02-06 LAB — POTASSIUM SERPL-SCNC: 4.8 MEQ/L (ref 3.4–4.9)

## 2020-03-21 LAB
BACTERIA: ABNORMAL /HPF
BILIRUBIN URINE: NEGATIVE
BLOOD, URINE: ABNORMAL
CLARITY: ABNORMAL
COLOR: YELLOW
GLUCOSE URINE: NEGATIVE MG/DL
KETONES, URINE: NEGATIVE MG/DL
LEUKOCYTE ESTERASE, URINE: ABNORMAL
NITRITE, URINE: POSITIVE
PH UA: 5.5 (ref 5–9)
PROTEIN UA: 100 MG/DL
RBC UA: ABNORMAL /HPF (ref 0–2)
SPECIFIC GRAVITY UA: 1.02 (ref 1–1.03)
UROBILINOGEN, URINE: 0.2 E.U./DL
WBC UA: ABNORMAL /HPF (ref 0–5)

## 2020-03-23 LAB
ORGANISM: ABNORMAL
URINE CULTURE, ROUTINE: ABNORMAL

## 2020-03-26 ENCOUNTER — VIRTUAL VISIT (OUTPATIENT)
Dept: GERIATRIC MEDICINE | Age: 85
End: 2020-03-26
Payer: MEDICARE

## 2020-04-26 NOTE — PROGRESS NOTES
COVID-19 and provide necessary medical care. The patient (and/or legal guardian) has also been advised to contact this office for worsening conditions or problems, and seek emergency medical treatment and/or call 911 if deemed necessary. Patient identification was verified at the start of the visit: Yes    Total time spent on this encounter: 20 min    Services were provided through a video synchronous discussion virtually to substitute for in-person clinic visit. Patient and provider were located at their individual homes. --Patrizia Kong MD on 4/25/2020 at 10:04 PM    An electronic signature was used to authenticate this note.

## 2020-06-09 LAB
ANION GAP SERPL CALCULATED.3IONS-SCNC: 10 MEQ/L (ref 9–15)
BACTERIA: ABNORMAL /HPF
BILIRUBIN URINE: NEGATIVE
BLOOD, URINE: ABNORMAL
BUN BLDV-MCNC: 23 MG/DL (ref 8–23)
CALCIUM SERPL-MCNC: 10.1 MG/DL (ref 8.5–9.9)
CHLORIDE BLD-SCNC: 102 MEQ/L (ref 95–107)
CLARITY: ABNORMAL
CO2: 26 MEQ/L (ref 20–31)
COLOR: YELLOW
CREAT SERPL-MCNC: 0.65 MG/DL (ref 0.5–0.9)
EPITHELIAL CELLS, UA: ABNORMAL /HPF (ref 0–5)
GFR AFRICAN AMERICAN: >60
GFR NON-AFRICAN AMERICAN: >60
GLUCOSE BLD-MCNC: 99 MG/DL (ref 70–99)
GLUCOSE URINE: NEGATIVE MG/DL
HCT VFR BLD CALC: 38.6 % (ref 37–47)
HEMOGLOBIN: 12.8 G/DL (ref 12–16)
HYALINE CASTS: ABNORMAL /HPF (ref 0–5)
KETONES, URINE: NEGATIVE MG/DL
LEUKOCYTE ESTERASE, URINE: NEGATIVE
MCH RBC QN AUTO: 30.9 PG (ref 27–31.3)
MCHC RBC AUTO-ENTMCNC: 33.1 % (ref 33–37)
MCV RBC AUTO: 93.2 FL (ref 82–100)
NITRITE, URINE: NEGATIVE
PDW BLD-RTO: 14.3 % (ref 11.5–14.5)
PH UA: 6 (ref 5–9)
PLATELET # BLD: 242 K/UL (ref 130–400)
POTASSIUM SERPL-SCNC: 4.3 MEQ/L (ref 3.4–4.9)
PROTEIN UA: 100 MG/DL
RBC # BLD: 4.14 M/UL (ref 4.2–5.4)
RBC UA: ABNORMAL /HPF (ref 0–5)
SODIUM BLD-SCNC: 138 MEQ/L (ref 135–144)
SPECIFIC GRAVITY UA: 1.02 (ref 1–1.03)
UROBILINOGEN, URINE: 0.2 E.U./DL
WBC # BLD: 6 K/UL (ref 4.8–10.8)
WBC UA: ABNORMAL /HPF (ref 0–5)

## 2020-06-11 LAB
ORGANISM: ABNORMAL
ORGANISM: ABNORMAL
URINE CULTURE, ROUTINE: ABNORMAL
URINE CULTURE, ROUTINE: ABNORMAL

## 2020-06-17 ENCOUNTER — OFFICE VISIT (OUTPATIENT)
Dept: GERIATRIC MEDICINE | Age: 85
End: 2020-06-17
Payer: MEDICARE

## 2020-07-17 NOTE — PROGRESS NOTES
Patient Name: Mayra Higgins  YOB: 1931  Medical Record Number: [id number]              History of Present Illness:  Patient seen today with her  present. Seen for follow-up visit for her dementia  Degenerative disease hypertension patient has been clinically stable. .  Per nursing per patient has not had new emesis fevers or chills. No evidence of new acute psychosis. No new falls. No new lightheadedness. Oral intake has been good. She remains negative from COVID-19 type symptoms.       Review of Systems   Unable to perform ROS: Dementia       Review of Systems: All 14 review of systems negative other than as stated above    Social History     Tobacco Use    Smoking status: Never Smoker    Smokeless tobacco: Never Used   Substance Use Topics    Alcohol use: No    Drug use: No         Past Medical History:   Diagnosis Date    Alzheimer's dementia     Anxiety     CAD (coronary artery disease)     Dementia     Hyperlipidemia     Hypertension     Hypothyroidism     Melanoma ([address]     skin    Osteoarthritis            Past Surgical History:   Procedure Laterality Date    BACK SURGERY      BREAST SURGERY      biopsy    CORONARY ANGIOPLASTY WITH STENT PLACEMENT      HYSTERECTOMY  1960         Current Outpatient Medications on File Prior to Visit   Medication Sig Dispense Refill    acetaminophen (TYLENOL) 325 MG tablet Take 650 mg by mouth 3 times daily      amLODIPine (NORVASC) 5 MG tablet Take 5 mg by mouth 2 times daily      aspirin 81 MG tablet Take 81 mg by mouth daily      atorvastatin (LIPITOR) 10 MG tablet Take 10 mg by mouth daily      nebivolol (BYSTOLIC) 10 MG tablet Take 10 mg by mouth daily      Calcium 600-200 MG-UNIT TABS Take by mouth daily      Multiple Vitamins-Minerals (CENTRUM SILVER) TABS Take by mouth daily      donepezil (ARICEPT) 5 MG tablet Take 5 mg by mouth nightly      levothyroxine (SYNTHROID) 25 MCG tablet Take 25 mcg by mouth See Admin Instructions MONDAYS;  GIVE 12.5 MG BY MOUTH ALL OTHER DAYS      lisinopril (PRINIVIL;ZESTRIL) 40 MG tablet Take 40 mg by mouth daily      memantine ER (NAMENDA XR) 14 MG CP24 extended release capsule Take 14 mg by mouth daily      nitroGLYCERIN (NITROSTAT) 0.4 MG SL tablet Place 0.4 mg under the tongue every 5 minutes as needed for Chest pain up to max of 3 total doses. If no relief after 1 dose, call 911.  Cholecalciferol (VITAMIN D3) 1000 units TABS Take by mouth daily      acetaminophen (TYLENOL) 325 MG tablet Take 650 mg by mouth daily as needed for Pain      Calcium 500 MG CHEW chewable tablet Take 1,000 mg by mouth 3 times daily as needed      LORazepam (ATIVAN PO) Take by mouth nightly as needed       No current facility-administered medications on file prior to visit. Allergies   Allergen Reactions    Amoxil [Amoxicillin]     Calcium Citrate     Noroxin [Norfloxacin]     Tiazac [Diltiazem]          Family History   Problem Relation Age of Onset    Cancer Brother          Physical Exam:      Physical Exam   HENT:   Head: Atraumatic. Eyes: EOM are normal.   Neck: No thyromegaly present. Cardiovascular: Normal rate and regular rhythm. Pulmonary/Chest: She has no wheezes. Abdominal: Soft. Bowel sounds are normal. There is no rebound. Neurological: She is alert. Skin: Skin is warm. She is diaphoretic. Nursing note and vitals reviewed. There were no vitals taken for this visit.       .   Lab Results   Component Value Date    WBC 6.0 06/09/2020    HGB 12.8 06/09/2020    HCT 38.6 06/09/2020    MCV 93.2 06/09/2020     06/09/2020     Lab Results   Component Value Date     06/09/2020    K 4.3 06/09/2020     06/09/2020    CO2 26 06/09/2020    BUN 23 06/09/2020    CREATININE 0.65 06/09/2020    GLUCOSE 99 06/09/2020    GLUCOSE 111 01/30/2020    CALCIUM 10.1 06/09/2020                ASSESSMENT:  Patient Active Problem List   Diagnosis    Hypothyroidism   

## 2020-08-19 ENCOUNTER — OFFICE VISIT (OUTPATIENT)
Dept: GERIATRIC MEDICINE | Age: 85
End: 2020-08-19
Payer: MEDICARE

## 2020-09-08 RX ORDER — LORAZEPAM 0.5 MG/1
0.5 TABLET ORAL NIGHTLY PRN
Qty: 14 TABLET | Refills: 0 | Status: SHIPPED | OUTPATIENT
Start: 2020-09-08 | End: 2020-09-22

## 2020-09-09 ENCOUNTER — OFFICE VISIT (OUTPATIENT)
Dept: GERIATRIC MEDICINE | Age: 85
End: 2020-09-09
Payer: MEDICARE

## 2020-09-09 PROCEDURE — 99305 1ST NF CARE MODERATE MDM 35: CPT | Performed by: INTERNAL MEDICINE

## 2020-09-18 ASSESSMENT — PATIENT HEALTH QUESTIONNAIRE - PHQ9: DEPRESSION UNABLE TO ASSESS: FUNCTIONAL CAPACITY MOTIVATION LIMITS ACCURACY

## 2020-09-18 NOTE — PROGRESS NOTES
Patient Name: Wander Ross  YOB: 1931  Medical Record Number: [id number]      History of Present Illness: This 80 y.o. woman was seen in her room today. No acute pain crisis.  present at this time. She remains pleasantly confused at her baseline. No significant weight loss. No headaches or acute pain crisis. Globally weak.         Review of Systems   Unable to perform ROS: Dementia       Review of Systems: All 14 review of systems negative other than as stated above    Social History     Tobacco Use    Smoking status: Never Smoker    Smokeless tobacco: Never Used   Substance Use Topics    Alcohol use: No    Drug use: No         Past Medical History:   Diagnosis Date    Alzheimer's dementia     Anxiety     CAD (coronary artery disease)     Dementia     Hyperlipidemia     Hypertension     Hypothyroidism     Melanoma ([facility] [address]     skin    Osteoarthritis            Past Surgical History:   Procedure Laterality Date    BACK SURGERY      BREAST SURGERY      biopsy    CORONARY ANGIOPLASTY WITH STENT PLACEMENT      HYSTERECTOMY  1960         Current Outpatient Medications on File Prior to Visit   Medication Sig Dispense Refill    acetaminophen (TYLENOL) 325 MG tablet Take 650 mg by mouth 3 times daily      amLODIPine (NORVASC) 5 MG tablet Take 5 mg by mouth 2 times daily      aspirin 81 MG tablet Take 81 mg by mouth daily      atorvastatin (LIPITOR) 10 MG tablet Take 10 mg by mouth daily      nebivolol (BYSTOLIC) 10 MG tablet Take 10 mg by mouth daily      Calcium 600-200 MG-UNIT TABS Take by mouth daily      Multiple Vitamins-Minerals (CENTRUM SILVER) TABS Take by mouth daily      donepezil (ARICEPT) 5 MG tablet Take 5 mg by mouth nightly      levothyroxine (SYNTHROID) 25 MCG tablet Take 25 mcg by mouth See Admin Instructions MONDAYS;  GIVE 12.5 MG BY MOUTH ALL OTHER DAYS      lisinopril (PRINIVIL;ZESTRIL) 40 MG tablet Take 40 mg by mouth daily      memantine ER (NAMENDA XR) 14 MG CP24 extended release capsule Take 14 mg by mouth daily      nitroGLYCERIN (NITROSTAT) 0.4 MG SL tablet Place 0.4 mg under the tongue every 5 minutes as needed for Chest pain up to max of 3 total doses. If no relief after 1 dose, call 911.  Cholecalciferol (VITAMIN D3) 1000 units TABS Take by mouth daily      acetaminophen (TYLENOL) 325 MG tablet Take 650 mg by mouth daily as needed for Pain      Calcium 500 MG CHEW chewable tablet Take 1,000 mg by mouth 3 times daily as needed       No current facility-administered medications on file prior to visit. Allergies   Allergen Reactions    Amoxil [Amoxicillin]     Calcium Citrate     Noroxin [Norfloxacin]     Tiazac [Diltiazem]          Family History   Problem Relation Age of Onset    Cancer Brother          Physical Exam:      Physical Exam   Constitutional: She appears well-nourished. No distress. HENT:   Head: Normocephalic and atraumatic. Eyes: Pupils are equal, round, and reactive to light. EOM are normal.   Neck: Neck supple. No thyromegaly present. Cardiovascular: Normal rate and regular rhythm. Pulmonary/Chest: Effort normal and breath sounds normal.   Abdominal: Soft. Bowel sounds are normal. There is no abdominal tenderness. Musculoskeletal: Normal range of motion. General: No edema. Neurological: She is alert. Skin: Skin is warm and dry. Psychiatric: She has a normal mood and affect. Nursing note and vitals reviewed. There were no vitals taken for this visit.       .   Lab Results   Component Value Date    WBC 6.0 09/03/2020    HGB 11.6 (L) 09/03/2020    HCT 35.7 (L) 09/03/2020    MCV 92 09/03/2020     09/03/2020     Lab Results   Component Value Date     09/03/2020    K 4.1 09/03/2020     09/03/2020    CO2 26 06/09/2020    BUN 23 06/09/2020    CREATININE 0.75 09/03/2020    GLUCOSE 171 09/03/2020    CALCIUM 9.0 09/03/2020                ASSESSMENT:  Patient Active Problem List   Diagnosis  Hypothyroidism    Osteoarthritis    CAD (coronary artery disease)    Hyperlipidemia    Hypertension    Late onset Alzheimer's disease without behavioral disturbance (HCC)    Generalized anxiety disorder    Petechial rash     Lab Results   Component Value Date    TSH 0.341 01/31/2014         PLAN:   Diagnosis Orders   1. Hypothyroidism, unspecified type     2. D[facility] without behavioral disturbance, unspecified [facility] type [facility]     3. Essential hypertension       Repeat TSH in 6 months. Continue non psychopharmacological therapy. Repeat BMP in 6 months.

## 2020-10-10 NOTE — PROGRESS NOTES
911.      Cholecalciferol (VITAMIN D3) 1000 units TABS Take by mouth daily      acetaminophen (TYLENOL) 325 MG tablet Take 650 mg by mouth daily as needed for Pain      Calcium 500 MG CHEW chewable tablet Take 1,000 mg by mouth 3 times daily as needed       No current facility-administered medications on file prior to visit. Allergies   Allergen Reactions    Amoxil [Amoxicillin]     Calcium Citrate     Noroxin [Norfloxacin]     Tiazac [Diltiazem]          Family History   Problem Relation Age of Onset    Cancer Brother          Physical Exam:      Physical Exam    There were no vitals taken for this visit.       .   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

## 2020-10-28 LAB — C DIFF TOXIN/ANTIGEN: NORMAL

## 2020-11-02 ENCOUNTER — OFFICE VISIT (OUTPATIENT)
Dept: GERIATRIC MEDICINE | Age: 85
End: 2020-11-02
Payer: MEDICARE

## 2020-11-02 PROCEDURE — 99308 SBSQ NF CARE LOW MDM 20: CPT | Performed by: NURSE PRACTITIONER

## 2020-11-03 LAB
ALBUMIN SERPL-MCNC: 3.6 G/DL (ref 3.5–4.6)
ALP BLD-CCNC: 68 U/L (ref 40–130)
ALT SERPL-CCNC: 9 U/L (ref 0–33)
ANION GAP SERPL CALCULATED.3IONS-SCNC: 15 MEQ/L (ref 9–15)
AST SERPL-CCNC: 17 U/L (ref 0–35)
BILIRUB SERPL-MCNC: 0.5 MG/DL (ref 0.2–0.7)
BUN BLDV-MCNC: 21 MG/DL (ref 8–23)
C-REACTIVE PROTEIN: 14.4 MG/L (ref 0–5)
CALCIUM SERPL-MCNC: 9.2 MG/DL (ref 8.5–9.9)
CHLORIDE BLD-SCNC: 101 MEQ/L (ref 95–107)
CO2: 24 MEQ/L (ref 20–31)
CREAT SERPL-MCNC: 0.74 MG/DL (ref 0.5–0.9)
D DIMER: 0.59 MG/L FEU (ref 0–0.5)
FERRITIN: 284.5 NG/ML (ref 13–150)
GFR AFRICAN AMERICAN: >60
GFR NON-AFRICAN AMERICAN: >60
GLOBULIN: 3 G/DL (ref 2.3–3.5)
GLUCOSE BLD-MCNC: 80 MG/DL (ref 70–99)
HCT VFR BLD CALC: 39 % (ref 37–47)
HEMOGLOBIN: 13 G/DL (ref 12–16)
LACTATE DEHYDROGENASE: 229 U/L (ref 135–214)
MCH RBC QN AUTO: 30.6 PG (ref 27–31.3)
MCHC RBC AUTO-ENTMCNC: 33.4 % (ref 33–37)
MCV RBC AUTO: 91.8 FL (ref 82–100)
PDW BLD-RTO: 15 % (ref 11.5–14.5)
PLATELET # BLD: 232 K/UL (ref 130–400)
POTASSIUM SERPL-SCNC: 3.2 MEQ/L (ref 3.4–4.9)
RBC # BLD: 4.24 M/UL (ref 4.2–5.4)
SODIUM BLD-SCNC: 140 MEQ/L (ref 135–144)
TOTAL CK: 33 U/L (ref 0–170)
TOTAL PROTEIN: 6.6 G/DL (ref 6.3–8)
TROPONIN: 0.02 NG/ML (ref 0–0.01)
WBC # BLD: 5.7 K/UL (ref 4.8–10.8)

## 2020-11-04 ENCOUNTER — OFFICE VISIT (OUTPATIENT)
Dept: GERIATRIC MEDICINE | Age: 85
End: 2020-11-04
Payer: MEDICARE

## 2020-11-04 PROCEDURE — 99308 SBSQ NF CARE LOW MDM 20: CPT | Performed by: NURSE PRACTITIONER

## 2020-11-04 RX ORDER — LORAZEPAM 0.5 MG/1
0.5 TABLET ORAL DAILY PRN
COMMUNITY
End: 2020-11-20 | Stop reason: SDUPTHER

## 2020-11-04 RX ORDER — ASCORBIC ACID 500 MG
500 TABLET ORAL 2 TIMES DAILY
COMMUNITY
End: 2023-04-17

## 2020-11-04 RX ORDER — ZINC SULFATE 50(220)MG
50 CAPSULE ORAL DAILY
COMMUNITY
End: 2023-04-17

## 2020-11-05 ENCOUNTER — OFFICE VISIT (OUTPATIENT)
Dept: GERIATRIC MEDICINE | Age: 85
End: 2020-11-05
Payer: MEDICARE

## 2020-11-05 LAB
POTASSIUM SERPL-SCNC: 3.4 MEQ/L (ref 3.4–4.9)
PROCALCITONIN: <0.07 NG/ML

## 2020-11-05 PROCEDURE — 99309 SBSQ NF CARE MODERATE MDM 30: CPT | Performed by: INTERNAL MEDICINE

## 2020-11-06 ENCOUNTER — OFFICE VISIT (OUTPATIENT)
Dept: GERIATRIC MEDICINE | Age: 85
End: 2020-11-06
Payer: MEDICARE

## 2020-11-06 DIAGNOSIS — U07.1 COVID-19: Primary | ICD-10-CM

## 2020-11-06 PROCEDURE — 99308 SBSQ NF CARE LOW MDM 20: CPT | Performed by: NURSE PRACTITIONER

## 2020-11-07 ENCOUNTER — OFFICE VISIT (OUTPATIENT)
Dept: GERIATRIC MEDICINE | Age: 85
End: 2020-11-07
Payer: MEDICARE

## 2020-11-07 PROCEDURE — 99309 SBSQ NF CARE MODERATE MDM 30: CPT | Performed by: INTERNAL MEDICINE

## 2020-11-08 ENCOUNTER — OFFICE VISIT (OUTPATIENT)
Dept: GERIATRIC MEDICINE | Age: 85
End: 2020-11-08
Payer: MEDICARE

## 2020-11-08 PROCEDURE — 99309 SBSQ NF CARE MODERATE MDM 30: CPT | Performed by: INTERNAL MEDICINE

## 2020-11-09 ENCOUNTER — OFFICE VISIT (OUTPATIENT)
Dept: GERIATRIC MEDICINE | Age: 85
End: 2020-11-09
Payer: MEDICARE

## 2020-11-09 DIAGNOSIS — U07.1 COVID-19: Primary | ICD-10-CM

## 2020-11-09 PROCEDURE — 99308 SBSQ NF CARE LOW MDM 20: CPT | Performed by: NURSE PRACTITIONER

## 2020-11-10 ENCOUNTER — OFFICE VISIT (OUTPATIENT)
Dept: GERIATRIC MEDICINE | Age: 85
End: 2020-11-10
Payer: MEDICARE

## 2020-11-10 LAB
ALBUMIN SERPL-MCNC: 3.6 G/DL (ref 3.5–4.6)
ALP BLD-CCNC: 66 U/L (ref 40–130)
ALT SERPL-CCNC: 18 U/L (ref 0–33)
ANION GAP SERPL CALCULATED.3IONS-SCNC: 12 MEQ/L (ref 9–15)
AST SERPL-CCNC: 20 U/L (ref 0–35)
BILIRUB SERPL-MCNC: 0.3 MG/DL (ref 0.2–0.7)
BUN BLDV-MCNC: 19 MG/DL (ref 8–23)
C-REACTIVE PROTEIN: 5.4 MG/L (ref 0–5)
CALCIUM SERPL-MCNC: 9.3 MG/DL (ref 8.5–9.9)
CHLORIDE BLD-SCNC: 103 MEQ/L (ref 95–107)
CO2: 26 MEQ/L (ref 20–31)
CREAT SERPL-MCNC: 0.76 MG/DL (ref 0.5–0.9)
D DIMER: 0.66 MG/L FEU (ref 0–0.5)
FERRITIN: 272.4 NG/ML (ref 13–150)
GFR AFRICAN AMERICAN: >60
GFR NON-AFRICAN AMERICAN: >60
GLOBULIN: 2.4 G/DL (ref 2.3–3.5)
GLUCOSE BLD-MCNC: 121 MG/DL (ref 70–99)
HCT VFR BLD CALC: 38.2 % (ref 37–47)
HEMOGLOBIN: 12.6 G/DL (ref 12–16)
LACTATE DEHYDROGENASE: 197 U/L (ref 135–214)
MCH RBC QN AUTO: 30.1 PG (ref 27–31.3)
MCHC RBC AUTO-ENTMCNC: 32.9 % (ref 33–37)
MCV RBC AUTO: 91.6 FL (ref 82–100)
PDW BLD-RTO: 14.8 % (ref 11.5–14.5)
PLATELET # BLD: 301 K/UL (ref 130–400)
POTASSIUM SERPL-SCNC: 3.2 MEQ/L (ref 3.4–4.9)
RBC # BLD: 4.17 M/UL (ref 4.2–5.4)
SODIUM BLD-SCNC: 141 MEQ/L (ref 135–144)
TOTAL CK: 29 U/L (ref 0–170)
TOTAL PROTEIN: 6 G/DL (ref 6.3–8)
TROPONIN: <0.01 NG/ML (ref 0–0.01)
WBC # BLD: 6.4 K/UL (ref 4.8–10.8)

## 2020-11-10 PROCEDURE — 99308 SBSQ NF CARE LOW MDM 20: CPT | Performed by: INTERNAL MEDICINE

## 2020-11-11 ENCOUNTER — OFFICE VISIT (OUTPATIENT)
Dept: GERIATRIC MEDICINE | Age: 85
End: 2020-11-11
Payer: MEDICARE

## 2020-11-11 DIAGNOSIS — U07.1 COVID-19: Primary | ICD-10-CM

## 2020-11-11 LAB — PROCALCITONIN: 0.14 NG/ML

## 2020-11-11 PROCEDURE — 99308 SBSQ NF CARE LOW MDM 20: CPT | Performed by: NURSE PRACTITIONER

## 2020-11-12 ENCOUNTER — OFFICE VISIT (OUTPATIENT)
Dept: GERIATRIC MEDICINE | Age: 85
End: 2020-11-12
Payer: MEDICARE

## 2020-11-12 PROCEDURE — 99309 SBSQ NF CARE MODERATE MDM 30: CPT | Performed by: INTERNAL MEDICINE

## 2020-11-13 ENCOUNTER — OFFICE VISIT (OUTPATIENT)
Dept: GERIATRIC MEDICINE | Age: 85
End: 2020-11-13
Payer: MEDICARE

## 2020-11-13 PROCEDURE — 99308 SBSQ NF CARE LOW MDM 20: CPT | Performed by: NURSE PRACTITIONER

## 2020-11-14 ENCOUNTER — VIRTUAL VISIT (OUTPATIENT)
Dept: GERIATRIC MEDICINE | Age: 85
End: 2020-11-14
Payer: MEDICARE

## 2020-11-14 PROCEDURE — 99309 SBSQ NF CARE MODERATE MDM 30: CPT | Performed by: NURSE PRACTITIONER

## 2020-11-15 ENCOUNTER — VIRTUAL VISIT (OUTPATIENT)
Dept: GERIATRIC MEDICINE | Age: 85
End: 2020-11-15
Payer: MEDICARE

## 2020-11-15 PROCEDURE — 99308 SBSQ NF CARE LOW MDM 20: CPT | Performed by: NURSE PRACTITIONER

## 2020-11-16 ENCOUNTER — OFFICE VISIT (OUTPATIENT)
Dept: GERIATRIC MEDICINE | Age: 85
End: 2020-11-16
Payer: MEDICARE

## 2020-11-16 PROCEDURE — 99309 SBSQ NF CARE MODERATE MDM 30: CPT | Performed by: NURSE PRACTITIONER

## 2020-11-17 LAB
ALBUMIN SERPL-MCNC: 3.4 G/DL (ref 3.5–4.6)
ALP BLD-CCNC: 62 U/L (ref 40–130)
ALT SERPL-CCNC: 20 U/L (ref 0–33)
ANION GAP SERPL CALCULATED.3IONS-SCNC: 10 MEQ/L (ref 9–15)
AST SERPL-CCNC: 22 U/L (ref 0–35)
BILIRUB SERPL-MCNC: <0.2 MG/DL (ref 0.2–0.7)
BUN BLDV-MCNC: 23 MG/DL (ref 8–23)
C-REACTIVE PROTEIN: 4.6 MG/L (ref 0–5)
CALCIUM SERPL-MCNC: 9.2 MG/DL (ref 8.5–9.9)
CHLORIDE BLD-SCNC: 105 MEQ/L (ref 95–107)
CO2: 27 MEQ/L (ref 20–31)
CREAT SERPL-MCNC: 0.67 MG/DL (ref 0.5–0.9)
D DIMER: 0.53 MG/L FEU (ref 0–0.5)
FERRITIN: 197.9 NG/ML (ref 13–150)
GFR AFRICAN AMERICAN: >60
GFR NON-AFRICAN AMERICAN: >60
GLOBULIN: 2.5 G/DL (ref 2.3–3.5)
GLUCOSE BLD-MCNC: 90 MG/DL (ref 70–99)
HCT VFR BLD CALC: 34.8 % (ref 37–47)
HEMOGLOBIN: 11.6 G/DL (ref 12–16)
LACTATE DEHYDROGENASE: 160 U/L (ref 135–214)
MCH RBC QN AUTO: 30.7 PG (ref 27–31.3)
MCHC RBC AUTO-ENTMCNC: 33.5 % (ref 33–37)
MCV RBC AUTO: 91.7 FL (ref 82–100)
PDW BLD-RTO: 14.9 % (ref 11.5–14.5)
PLATELET # BLD: 257 K/UL (ref 130–400)
POTASSIUM SERPL-SCNC: 3.6 MEQ/L (ref 3.4–4.9)
PROCALCITONIN: 0.22 NG/ML (ref 0–0.15)
RBC # BLD: 3.79 M/UL (ref 4.2–5.4)
SODIUM BLD-SCNC: 142 MEQ/L (ref 135–144)
TOTAL CK: 25 U/L (ref 0–170)
TOTAL PROTEIN: 5.9 G/DL (ref 6.3–8)
TROPONIN: <0.01 NG/ML (ref 0–0.01)
WBC # BLD: 5.7 K/UL (ref 4.8–10.8)

## 2020-11-18 ENCOUNTER — OFFICE VISIT (OUTPATIENT)
Dept: GERIATRIC MEDICINE | Age: 85
End: 2020-11-18
Payer: MEDICARE

## 2020-11-18 PROCEDURE — 99308 SBSQ NF CARE LOW MDM 20: CPT | Performed by: NURSE PRACTITIONER

## 2020-11-24 RX ORDER — LORAZEPAM 0.5 MG/1
0.5 TABLET ORAL DAILY PRN
Qty: 14 TABLET | Refills: 0 | Status: SHIPPED | OUTPATIENT
Start: 2020-11-24 | End: 2020-12-08

## 2020-11-30 NOTE — PROGRESS NOTES
[address]  Chief Complaint   Patient presents with    Other     covid       The patient is being seen today for acute visit for COVID-19. SUBJECTIVE:  Nursing staff report resident remains at her baseline. She is confused at her baseline. They do report her pulse ox was 92% on room air earlier, otherwise she has remained stable. FAMILY AND SOCIAL HISTORY:  Refer to H&P. Past Medical History:   Diagnosis Date    Alzheimer's dementia     Anxiety     CAD (coronary artery disease)     Dementia     Hyperlipidemia     Hypertension     Hypothyroidism     Melanoma ([address]     skin    Osteoarthritis        MEDICATIONS AND ALLERGIES:  Reviewed on chart at nursing facility. REVIEW OF SYSTEMS:  Resident denies any headache, dizziness, blurred vision, sore throat, cough, chest pain, shortness of breath, abdominal pain, nausea, vomiting, or changes in her bowel or bladder habits. She reports she is eating okay, sleeping okay, and has no pain. PHYSICAL EXAMINATION:  Most recent vital signs:  /60, temp 98.6, heart rate 60, respirations 16, pulse oxing 100% currently on room air, weight 139. Constitutional:  Resident is alert, elderly, confused female, in no apparent distress, pleasant and cooperative with examination. Integument:  Pink, warm, dry. HEENT:  Normocephalic, atraumatic. Conjunctivae pink. Sclerae nonicteric. Mucous membrane pink, moist.  No oropharyngeal exudate. Neck:  Supple. No cervical or clavicular lymphadenopathy. Cardiovascular:  Regular rate and rhythm. No murmurs, gallops, rubs noted. Respiratory:  Lung sounds cleared through all fields. Respirations even, nonlabored. No use of accessory muscles with breathing. No cough noted. Abdomen:  Soft, nontender, nondistended, normoactive bowel sounds. PV:  Peripheral pulses present. No edema noted. ASSESSMENT AND PLAN:  COVID-19:  Resident is currently stable.  She has not had any episodes of hypoxia or respiratory distress. We will continue to monitor. I have reviewed this resident's medication and treatment plan as well as most recent lab work. No further changes will be made at this time. Return in about 1 day (around 11/3/2020). Electronically Signed By: Chicho Rai. Claudia OSUNA CNP on 11/30/2020 15:47:20  ______________________________  Chicho Rai.  MICHAEL Hilton/[id number]  D: 11/30/2020 13:12:33  T: 11/30/2020 14:48:44    cc: - [facility]

## 2020-12-02 VITALS
TEMPERATURE: 98.6 F | BODY MASS INDEX: 30.34 KG/M2 | HEART RATE: 60 BPM | WEIGHT: 139 LBS | SYSTOLIC BLOOD PRESSURE: 116 MMHG | OXYGEN SATURATION: 100 % | DIASTOLIC BLOOD PRESSURE: 60 MMHG | RESPIRATION RATE: 16 BRPM

## 2020-12-05 NOTE — PROGRESS NOTES
Subjective:     CC: COVID-19    HPI:  Edilberto Whaley is a 80 y.o. female was seen today for COVID 19 evaluation. Patient has been COVID 19 positive since 11/2. They were seen with full PPE and observing continued droplet precautions. Condition discussed with nursing staff and treatment reviewed. Recent bloodwork, chest x-ray and vital signs reviewed. Fever curve and oxygen demands reviewed. Nutritional status and intake reviewed. Patient is demonstrating increased respiratory complaints at this time. Patient has not been febrile in the last 24 hours. Patient is able to feed themselves.     Past Medical History:   Diagnosis Date    Alzheimer's dementia     Anxiety     CAD (coronary artery disease)     Dementia     Hyperlipidemia     Hypertension     Hypothyroidism     [facility] ([facility]     skin    Osteoarthritis      Past Surgical History:   Procedure Laterality Date    BACK SURGERY      BREAST SURGERY      biopsy    CORONARY ANGIOPLASTY WITH STENT PLACEMENT      HYSTERECTOMY  1960     Family History   Problem Relation Age of Onset    Cancer Brother      Social History     Socioeconomic History    Marital status:      Spouse name: Not on file    Number of children: Not on file    Years of education: Not on file    Highest education level: Not on file   Occupational History    Not on file   Social Needs    Financial resource strain: Not on file    Food insecurity     Worry: Not on file     Inability: Not on file    Transportation needs     Medical: Not on file     Non-medical: Not on file   Tobacco Use    Smoking status: Never Smoker    Smokeless tobacco: Never Used   Substance and Sexual Activity    Alcohol use: No    Drug use: No    Sexual activity: Not on file   Lifestyle    Physical activity     Days per week: Not on file     Minutes per session: Not on file    Stress: Not on file   Relationships    Social connections     Talks on phone: Not on file     Gets together: Not on file     Attends [employer] service: Not on file     Active member of club or organization: Not on file     Attends meetings of clubs or organizations: Not on file     Relationship status: Not on file    Intimate partner violence     Fear of current or ex partner: Not on file     Emotionally abused: Not on file     Physically abused: Not on file     Forced sexual activity: Not on file   Other Topics Concern    Not on file   Social History Narrative    Not on file     Current Outpatient Medications on File Prior to Visit   Medication Sig Dispense Refill    enoxaparin (LOVENOX) 30 MG/0.3ML injection Inject into the skin 0.3mL every 12 hours      vitamin C (ASCORBIC ACID) 500 MG tablet Take 500 mg by mouth 2 times daily      zinc sulfate (ZINCATE) 220 (50 Zn) MG capsule Take 50 mg by mouth daily      acetaminophen (TYLENOL) 325 MG tablet Take 650 mg by mouth 3 times daily      amLODIPine (NORVASC) 5 MG tablet Take 5 mg by mouth daily       aspirin 81 MG tablet Take 81 mg by mouth daily      atorvastatin (LIPITOR) 10 MG tablet Take 10 mg by mouth daily      nebivolol (BYSTOLIC) 10 MG tablet Take 10 mg by mouth daily      Calcium 600-200 MG-UNIT TABS Take 1 tablet by mouth daily       Multiple Vitamins-Minerals (CENTRUM SILVER) TABS Take 1 tablet by mouth daily       donepezil (ARICEPT) 5 MG tablet Take 5 mg by mouth nightly      levothyroxine (SYNTHROID) 25 MCG tablet Take 25 mcg by mouth See Admin Instructions MONDAYS;  GIVE 12.5 MG BY MOUTH ALL OTHER DAYS      lisinopril (PRINIVIL;ZESTRIL) 40 MG tablet Take 20 mg by mouth daily       memantine ER (NAMENDA XR) 14 MG CP24 extended release capsule Take 14 mg by mouth daily      nitroGLYCERIN (NITROSTAT) 0.4 MG SL tablet Place 0.4 mg under the tongue every 5 minutes as needed for Chest pain up to max of 3 total doses. If no relief after 1 dose, call 911.       Cholecalciferol (VITAMIN D3) 1000 units TABS Take 1,000 Units by mouth daily  acetaminophen (TYLENOL) 325 MG tablet Take 650 mg by mouth daily as needed for Pain      Calcium 500 MG CHEW chewable tablet Take 1,000 mg by mouth 3 times daily as needed       No current facility-administered medications on file prior to visit. Review of Systems   Unable to perform ROS: Dementia       Objective:     Physical Exam  Vitals signs and nursing note reviewed. Constitutional:       Appearance: Normal appearance. HENT:      Head: Atraumatic. Nose: Nose normal.      Mouth/Throat:      Mouth: Mucous membranes are dry. Neck:      Musculoskeletal: Normal range of motion. Cardiovascular:      Rate and Rhythm: Regular rhythm. Pulses: Normal pulses. Pulmonary:      Comments: Coarse BS, diminished at the bases  Abdominal:      General: Abdomen is flat. Palpations: Abdomen is soft. Tenderness: There is no abdominal tenderness. Musculoskeletal:         General: No swelling. Skin:     General: Skin is dry. Neurological:      Mental Status: She is alert. She is disoriented. Psychiatric:         Behavior: Behavior normal.          .[facility]  Lab Results   Component Value Date    FERRITIN 197.9 (H) 11/17/2020     Lab Results   Component Value Date    WBC 5.7 11/17/2020    HGB 11.6 (L) 11/17/2020    HCT 34.8 (L) 11/17/2020    MCV 91.7 11/17/2020     11/17/2020     Lab Results   Component Value Date     11/17/2020    K 3.6 11/17/2020     11/17/2020    CO2 27 11/17/2020    BUN 23 11/17/2020    CREATININE 0.67 11/17/2020    GLUCOSE 90 11/17/2020    GLUCOSE 171 09/03/2020    CALCIUM 9.2 11/17/2020      Lab Results   Component Value Date    CKTOTAL 25 11/17/2020    TROPONINI <0.010 11/17/2020     Lab Results   Component Value Date    CRP 4.6 11/17/2020      Lab Results   Component Value Date    DDIMER 0.53 ([facility]) 11/17/2020      Lab Results   Component Value Date    CKTOTAL 25 11/17/2020       No results found. Ranjeet polo      Assessment & Plan: Continue with current regimen of  Vitamin C 500mg PO BID  Vitamin D 1000 IU PO QD  Zinc 50 mg PO QD  Lovenox 30mg SQ BID    Patient does not require Dexamethasone 6mg PO QD  Patient does not  require antibiotics for concomitant bacterial pneumonia. Continue to monitor blood work:  Weekly D-Dimer, LDH, CRP, CPK, Procalcitonin, Ferritin, Troponin, CBC, BMP. Patient's case discussed with nursing staff and Code Status reviewed.     Raúl Way MD

## 2020-12-07 PROBLEM — U07.1 COVID-19: Status: ACTIVE | Noted: 2020-12-07

## 2020-12-07 NOTE — PROGRESS NOTES
Subjective:     CC: COVID-19    HPI:  Darryl Sage is a 80 y.o. female was seen today for COVID 19 evaluation. Patient has been COVID 19 positive since 11/5. They were seen with full PPE and observing continued droplet precautions. Condition discussed with nursing staff and treatment reviewed. Recent bloodwork, chest x-ray and vital signs reviewed. Fever curve and oxygen demands reviewed. Nutritional status and intake reviewed. Patient is demonstrating increased respiratory complaints at this time. Patient has not been febrile in the last 24 hours. Patient is able to feed themselves.     Past Medical History:   Diagnosis Date    Alzheimer's dementia     Anxiety     CAD (coronary artery disease)     Dementia     Hyperlipidemia     Hypertension     Hypothyroidism     Melanoma ([facility] [address]     skin    Osteoarthritis      Past Surgical History:   Procedure Laterality Date    BACK SURGERY      BREAST SURGERY      biopsy    CORONARY ANGIOPLASTY WITH STENT PLACEMENT      HYSTERECTOMY  1960     Family History   Problem Relation Age of Onset    Cancer Brother      Social History     Socioeconomic History    Marital status:      Spouse name: Not on file    Number of children: Not on file    Years of education: Not on file    Highest education level: Not on file   Occupational History    Not on file   Social Needs    Financial resource strain: Not on file    Food insecurity     Worry: Not on file     Inability: Not on file    Transportation needs     Medical: Not on file     Non-medical: Not on file   Tobacco Use    Smoking status: Never Smoker    Smokeless tobacco: Never Used   Substance and Sexual Activity    Alcohol use: No    Drug use: No    Sexual activity: Not on file   Lifestyle    Physical activity     Days per week: Not on file     Minutes per session: Not on file    Stress: Not on file   Relationships    Social connections     Talks on phone: Not on file     Gets  acetaminophen (TYLENOL) 325 MG tablet Take 650 mg by mouth daily as needed for Pain      Calcium 500 MG CHEW chewable tablet Take 1,000 mg by mouth 3 times daily as needed       No current facility-administered medications on file prior to visit. Review of Systems   Unable to perform ROS: Dementia       Objective:     Physical Exam  Vitals signs and nursing note reviewed. HENT:      Head: Atraumatic. Nose: Nose normal.      Mouth/Throat:      Mouth: Mucous membranes are dry. Eyes:      Extraocular Movements: Extraocular movements intact. Neck:      Musculoskeletal: Neck supple. Cardiovascular:      Rate and Rhythm: Normal rate and regular rhythm. Pulmonary:      Effort: Pulmonary effort is normal.      Breath sounds: Rhonchi present. Abdominal:      General: Abdomen is flat. Bowel sounds are normal.      Palpations: Abdomen is soft. Skin:     Findings: No rash. Neurological:      Mental Status: She is alert. She is disoriented. Psychiatric:         Behavior: Behavior normal.          .[facility]  Lab Results   Component Value Date    FERRITIN 197.9 (H) 11/17/2020     Lab Results   Component Value Date    WBC 5.7 11/17/2020    HGB 11.6 (L) 11/17/2020    HCT 34.8 (L) 11/17/2020    MCV 91.7 11/17/2020     11/17/2020     Lab Results   Component Value Date     11/17/2020    K 3.6 11/17/2020     11/17/2020    CO2 27 11/17/2020    BUN 23 11/17/2020    CREATININE 0.67 11/17/2020    GLUCOSE 90 11/17/2020    GLUCOSE 171 09/03/2020    CALCIUM 9.2 11/17/2020      Lab Results   Component Value Date    CKTOTAL 25 11/17/2020    TROPONINI <0.010 11/17/2020     Lab Results   Component Value Date    CRP 4.6 11/17/2020      Lab Results   Component Value Date    DDIMER 0.53 ([facility]) 11/17/2020      Lab Results   Component Value Date    CKTOTAL 25 11/17/2020       No results found. Mariann polo      Assessment & Plan:     Continue with current regimen of  Vitamin C 500mg PO BID  Vitamin

## 2020-12-08 NOTE — PROGRESS NOTES
Subjective:     CC: COVID-19    HPI:  Viktor Correa is a 80 y.o. female was seen today for COVID 19 evaluation. Patient has been COVID 19 positive since 10/27. They were seen with full PPE and observing continued droplet precautions. Condition discussed with nursing staff and treatment reviewed. Recent bloodwork, chest x-ray and vital signs reviewed. Fever curve and oxygen demands reviewed. Nutritional status and intake reviewed. Patient is demonstrating increased respiratory complaints at this time. Patient has not been febrile in the last 24 hours. Patient is able to feed themselves.     Past Medical History:   Diagnosis Date    Alzheimer's dementia     Anxiety     CAD (coronary artery disease)     Dementia     Hyperlipidemia     Hypertension     Hypothyroidism     Melanoma ([facility] [address]     skin    Osteoarthritis      Past Surgical History:   Procedure Laterality Date    BACK SURGERY      BREAST SURGERY      biopsy    CORONARY ANGIOPLASTY WITH STENT PLACEMENT      HYSTERECTOMY  1960     Family History   Problem Relation Age of Onset    Cancer Brother      Social History     Socioeconomic History    Marital status:      Spouse name: Not on file    Number of children: Not on file    Years of education: Not on file    Highest education level: Not on file   Occupational History    Not on file   Social Needs    Financial resource strain: Not on file    Food insecurity     Worry: Not on file     Inability: Not on file    Transportation needs     Medical: Not on file     Non-medical: Not on file   Tobacco Use    Smoking status: Never Smoker    Smokeless tobacco: Never Used   Substance and Sexual Activity    Alcohol use: No    Drug use: No    Sexual activity: Not on file   Lifestyle    Physical activity     Days per week: Not on file     Minutes per session: Not on file    Stress: Not on file   Relationships    Social connections     Talks on phone: Not on file     Gets together: Not on file     Attends Anabaptist service: Not on file     Active member of club or organization: Not on file     Attends meetings of clubs or organizations: Not on file     Relationship status: Not on file    Intimate partner violence     Fear of current or ex partner: Not on file     Emotionally abused: Not on file     Physically abused: Not on file     Forced sexual activity: Not on file   Other Topics Concern    Not on file   Social History Narrative    Not on file     Current Outpatient Medications on File Prior to Visit   Medication Sig Dispense Refill    enoxaparin (LOVENOX) 30 MG/0.3ML injection Inject into the skin 0.3mL every 12 hours      vitamin C (ASCORBIC ACID) 500 MG tablet Take 500 mg by mouth 2 times daily      zinc sulfate (ZINCATE) 220 (50 Zn) MG capsule Take 50 mg by mouth daily      acetaminophen (TYLENOL) 325 MG tablet Take 650 mg by mouth 3 times daily      amLODIPine (NORVASC) 5 MG tablet Take 5 mg by mouth daily       aspirin 81 MG tablet Take 81 mg by mouth daily      atorvastatin (LIPITOR) 10 MG tablet Take 10 mg by mouth daily      nebivolol (BYSTOLIC) 10 MG tablet Take 10 mg by mouth daily      Calcium 600-200 MG-UNIT TABS Take 1 tablet by mouth daily       Multiple Vitamins-Minerals (CENTRUM SILVER) TABS Take 1 tablet by mouth daily       donepezil (ARICEPT) 5 MG tablet Take 5 mg by mouth nightly      levothyroxine (SYNTHROID) 25 MCG tablet Take 25 mcg by mouth See Admin Instructions MONDAYS;  GIVE 12.5 MG BY MOUTH ALL OTHER DAYS      lisinopril (PRINIVIL;ZESTRIL) 40 MG tablet Take 20 mg by mouth daily       memantine ER (NAMENDA XR) 14 MG CP24 extended release capsule Take 14 mg by mouth daily      nitroGLYCERIN (NITROSTAT) 0.4 MG SL tablet Place 0.4 mg under the tongue every 5 minutes as needed for Chest pain up to max of 3 total doses. If no relief after 1 dose, call 911.       Cholecalciferol (VITAMIN D3) 1000 units TABS Take 1,000 Units by mouth daily  acetaminophen (TYLENOL) 325 MG tablet Take 650 mg by mouth daily as needed for Pain      Calcium 500 MG CHEW chewable tablet Take 1,000 mg by mouth 3 times daily as needed       No current facility-administered medications on file prior to visit. Review of Systems   Unable to perform ROS: Dementia   All other systems reviewed and are negative. Objective:     Physical Exam  Vitals signs and nursing note reviewed. Constitutional:       Appearance: She is ill-appearing. HENT:      Head: Normocephalic and atraumatic. Nose: Nose normal.      Mouth/Throat:      Mouth: Mucous membranes are dry. Neck:      Musculoskeletal: No neck rigidity. Cardiovascular:      Rate and Rhythm: Normal rate and regular rhythm. Heart sounds: Normal heart sounds. Pulmonary:      Breath sounds: Wheezing present. Abdominal:      General: Bowel sounds are normal.      Palpations: Abdomen is soft. Tenderness: There is no abdominal tenderness. Musculoskeletal:         General: No swelling. Neurological:      General: No focal deficit present. Ranjeet Liao [address]  Lab Results   Component Value Date    FERRITIN 197.9 (H) 11/17/2020     Lab Results   Component Value Date    WBC 5.7 11/17/2020    HGB 11.6 (L) 11/17/2020    HCT 34.8 (L) 11/17/2020    MCV 91.7 11/17/2020     11/17/2020     Lab Results   Component Value Date     11/17/2020    K 3.6 11/17/2020     11/17/2020    CO2 27 11/17/2020    BUN 23 11/17/2020    CREATININE 0.67 11/17/2020    GLUCOSE 90 11/17/2020    GLUCOSE 171 09/03/2020    CALCIUM 9.2 11/17/2020      Lab Results   Component Value Date    CKTOTAL 25 11/17/2020    TROPONINI <0.010 11/17/2020     Lab Results   Component Value Date    CRP 4.6 11/17/2020      Lab Results   Component Value Date    DDIMER 0.53 ([facility]) 11/17/2020      Lab Results   Component Value Date    CKTOTAL 25 11/17/2020       No results found. Ranjeet Liao lastchestxray      Assessment & Plan:     Continue with

## 2020-12-08 NOTE — PROGRESS NOTES
[address]  Chief Complaint   Patient presents with    Other     covid       REASON FOR VISIT:  The patient is being seen today for acute visit for COVID-19. SUBJECTIVE:  Resident was tested and she did come back positive for COVID-19. She was transferred to this floor for further therapy and care. She offers no concerns. Nursing staff report she remains at her baseline. FAMILY AND SOCIAL HISTORY:  Refer to H&P. Past Medical History:   Diagnosis Date    Alzheimer's dementia     Anxiety     CAD (coronary artery disease)     Dementia     Hyperlipidemia     Hypertension     Hypothyroidism     Melanoma ([address]     skin    Osteoarthritis        MEDICATIONS AND ALLERGIES:  Reviewed on chart at nursing facility. REVIEW OF SYSTEMS:  Resident denies any headache, dizziness, blurred vision, sore throat, cough, chest pain, shortness of breath, abdominal pain, nausea, vomiting, or changes in her bowel or bladder habits. She states she is eating well, sleeping well, and has no pain. PHYSICAL EXAMINATION:  Most recent vital signs:  /55, temp 98.9, heart rate 66, respirations 16, pulse oxing 98% on room air. CONSTITUTIONAL:  Resident is sleeping, but arousable, elderly female, in no apparent distress, pleasant and cooperative with exam.  INTEGUMENT:  Pink, warm, dry. HEENT:  Normocephalic, atraumatic. Hard of hearing. Conjunctivae pink. Sclerae nonicteric. Mucous membranes pink, moist.  No oropharyngeal exudates. NECK:  Supple. No cervical or clavicular lymphadenopathy. CARDIOVASCULAR:  Regular rate and rhythm. No murmurs, gallops, or rubs noted. RESPIRATORY:  Lung sounds clear throughout all fields. Respirations even, nonlabored. No use of accessory muscles in breathing. No cough noted. ABDOMEN:  Soft, nontender, nondistended. Normoactive bowel sounds. PV:  Peripheral pulses present. No edema noted. ASSESSMENT AND PLAN:  1. COVID-19.   Resident is stable in her course. She has not had any hypoxic episodes or episodes of respiratory distress. We will continue to monitor her closely. I have reviewed this resident's medication and treatment plan, as well as, most recent lab work. No further changes will be made at this time. Return in about 1 day (around 11/5/2020). Electronically Signed By: Caity Robles CNP on 12/07/2020 21:21:25  ______________________________  Caity Robles CNP  [id number]  D: 12/03/2020 02:07:51  T: 12/03/2020 02:31:24    cc: - [facility]

## 2020-12-12 VITALS
TEMPERATURE: 97.9 F | DIASTOLIC BLOOD PRESSURE: 67 MMHG | OXYGEN SATURATION: 95 % | SYSTOLIC BLOOD PRESSURE: 141 MMHG | HEART RATE: 68 BPM | RESPIRATION RATE: 18 BRPM

## 2020-12-12 NOTE — PROGRESS NOTES
2020    [facility] [address]      TELEHEALTH EVALUATION -- Audio/Visual (During JMGTK-65 public health emergency)    HPI:    Katelin Keivn (:  1931) has requested an audio/video evaluation for the following concern(s):      visit for CC of COVID-19. Pt/Nurse noted  they had c/o  Hypoxia on RA. Is  relieved by oxygen NC. Needs verbal cues to eat. Review of Systems  ROS: Nurse/pt reports   Had decrease in appetite, no fever, is now weaker. Had  No  sore throat, and  cough. Respiratory: Nurse listened to lungs, c/o SOB, dyspnea, dyspnea on exertion, mild hypoxia  Cardiovascular: Nurse listened to heart sounds, no observed PND, orthopnea  GI: No V/D/C. : no reports of dysuria  Extremities: No reports of pain issues, no edema     Prior to Visit Medications    Medication Sig Taking?  Authorizing Provider   enoxaparin (LOVENOX) 30 MG/0.3ML injection Inject into the skin 0.3mL every 12 hours  Historical Provider, MD   vitamin C (ASCORBIC ACID) 500 MG tablet Take 500 mg by mouth 2 times daily  Historical Provider, MD   zinc sulfate (ZINCATE) 220 (50 Zn) MG capsule Take 50 mg by mouth daily  Historical Provider, MD   acetaminophen (TYLENOL) 325 MG tablet Take 650 mg by mouth 3 times daily  Historical Provider, MD   amLODIPine (NORVASC) 5 MG tablet Take 5 mg by mouth daily   Historical Provider, MD   aspirin 81 MG tablet Take 81 mg by mouth daily  Historical Provider, MD   atorvastatin (LIPITOR) 10 MG tablet Take 10 mg by mouth daily  Historical Provider, MD   nebivolol (BYSTOLIC) 10 MG tablet Take 10 mg by mouth daily  Historical Provider, MD   Calcium 600-200 MG-UNIT TABS Take 1 tablet by mouth daily   Historical Provider, MD   Multiple Vitamins-Minerals (CENTRUM SILVER) TABS Take 1 tablet by mouth daily   Historical Provider, MD   donepezil (ARICEPT) 5 MG tablet Take 5 mg by mouth nightly  Historical Provider, MD levothyroxine (SYNTHROID) 25 MCG tablet Take 25 mcg by mouth See Admin Instructions MONDAYS;  GIVE 12.5 MG BY MOUTH ALL OTHER DAYS  Historical Provider, MD   lisinopril (PRINIVIL;ZESTRIL) 40 MG tablet Take 20 mg by mouth daily   Historical Provider, MD   memantine ER (NAMENDA XR) 14 MG CP24 extended release capsule Take 14 mg by mouth daily  Historical Provider, MD   nitroGLYCERIN (NITROSTAT) 0.4 MG SL tablet Place 0.4 mg under the tongue every 5 minutes as needed for Chest pain up to max of 3 total doses. If no relief after 1 dose, call 911.   Historical Provider, MD   Cholecalciferol (VITAMIN D3) 1000 units TABS Take 1,000 Units by mouth daily   Historical Provider, MD   acetaminophen (TYLENOL) 325 MG tablet Take 650 mg by mouth daily as needed for Pain  Historical Provider, MD   Calcium 500 MG CHEW chewable tablet Take 1,000 mg by mouth 3 times daily as needed  Historical Provider, MD       Social History     Tobacco Use    Smoking status: Never Smoker    Smokeless tobacco: Never Used   Substance Use Topics    Alcohol use: No    Drug use: No        Allergies   Allergen Reactions    Amoxil [Amoxicillin]     Calcium Citrate     Noroxin [Norfloxacin]     Tiazac [Diltiazem]    ,   Past Medical History:   Diagnosis Date    Alzheimer's dementia     Anxiety     CAD (coronary artery disease)     Dementia     Hyperlipidemia     Hypertension     Hypothyroidism     Melanoma ([facility] [address]     skin    Osteoarthritis    ,   Past Surgical History:   Procedure Laterality Date    BACK SURGERY      BREAST SURGERY      biopsy    CORONARY ANGIOPLASTY WITH STENT PLACEMENT      HYSTERECTOMY  1960   ,   Social History     Tobacco Use    Smoking status: Never Smoker    Smokeless tobacco: Never Used   Substance Use Topics    Alcohol use: No    Drug use: No   ,   Family History   Problem Relation Age of Onset    Cancer Brother        PHYSICAL EXAMINATION: [ INSTRUCTIONS:  \"[x]\" Indicates a positive item  \"[]\" Indicates a negative item  -- DELETE ALL ITEMS NOT EXAMINED]  Vital Signs: (As obtained by patient/caregiver or practitioner observation)    BP (!) 141/67   Pulse 68   Temp 97.9 °F (36.6 °C)   Resp 18   SpO2 95% Comment: 90% RA      Constitutional: [x] Appears well-developed and well-nourished [x] No apparent distress      obese  Mental status  [x] Alert and awake   [x]Able to follow commands     CONFUSED AT BASELINE      Eyes:  EOM    [x]  Normal  [] Abnormal-  Sclera  [x]  Normal  [] Abnormal -         Discharge []  None visible  [] Abnormal -    HENT:   [x] Normocephalic, atraumatic. [] Abnormal   [x] Mouth/Throat: Mucous membranes are moist.     External Ears [] Normal  [] Abnormal-     Neck: [x] No visualized mass     Pulmonary/Chest: nurse reports : Heart sounds are  Regular   Lungs  sounds are LCTA   [x] Respiratory effort normal.  [x] No visualized signs of difficulty breathing or respiratory distress          Musculoskeletal:   Sitting upright in chair          [x] Normal range of motion of neck          Neurological:        [x] No Facial Asymmetry (Cranial nerve 7 motor function) (limited exam to video visit)          [] No gaze palsy                Skin:        [x] No significant exanthematous lesions or discoloration noted on facial skin                     Psychiatric:       [x] Normal Affect [x] No Hallucinations          Other pertinent observable physical exam findings-     ASSESSMENT/PLAN:   Diagnosis Orders   1. COVID-19     2. Hypoxia     1. Supportive care   2. add oxygen and dexamethasone   Standing orders for COVID -19 labs on diagnosis AND q week:  CBC  BMP  LFT  LDH  FERRITIN  PROCALCITONIN  CRP  D-DIMER  TROPONIN    CXR ON DIAGNOSIS    medications:  Vit D3 1000 units po bid  VIt C 500mg po bid  Zinc 50mg po daily  Pepcid 20mg po daily IF NOT on PPI    Adjust meds and POC    Return if symptoms worsen or fail to improve.

## 2020-12-13 VITALS
TEMPERATURE: 97.5 F | DIASTOLIC BLOOD PRESSURE: 58 MMHG | HEART RATE: 72 BPM | RESPIRATION RATE: 16 BRPM | OXYGEN SATURATION: 98 % | SYSTOLIC BLOOD PRESSURE: 96 MMHG

## 2020-12-14 NOTE — PROGRESS NOTES
[address]  Chief Complaint   Patient presents with    Other     covid       REASON FOR VISIT:  The patient being seen today for acute visit or COVID-19. SUBJECTIVE:  Resident reports she has no appetite. Otherwise she is fine. Nursing staff reports resident remains at her baseline. FAMILY AND SOCIAL HISTORY:  Refer to H&P. Past Medical History:   Diagnosis Date    Alzheimer's dementia     Anxiety     CAD (coronary artery disease)     Dementia     Hyperlipidemia     Hypertension     Hypothyroidism     Melanoma ([address]     skin    Osteoarthritis        MEDICATIONS AND ALLERGIES:  Reviewed on chart in Epic. REVIEW OF SYSTEMS:  Resident denies any headache, dizziness, blurred vision, sore throat, cough, shortness of breath, chest pain, abdominal pain, nausea, vomiting, or changes in her bowel or bladder habits. She reports she is eating okay, but she really has no appetite and she is sleeping okay. Otherwise, no concerns. PHYSICAL EXAMINATION:  Most recent vital signs /66, temp 97.6, heart rate 70, respirations 18, pulse oxing 95% on room air. Constitutional:  Resident is alert, elderly female in no apparent distress, pleasant and cooperative with exam.  Integument:  Pink, warm, dry. HEENT:  Normocephalic, atraumatic. Hard of hearing. Conjunctivae pink. Sclerae nonicteric. Mucous membranes pink, moist.  No oropharyngeal exudate. Neck:  Supple. No cervical or clavicular lymphadenopathy. Cardiovascular:  Regular rate and rhythm. No murmurs, gallops, rubs noted. Respiratory:  Lung sounds are clear throughout all fields. Respirations even and nonlabored. Abdomen:  Soft, nontender, nondistended, normoactive bowel sounds. PV:  Peripheral pulses present. No edema noted. ASSESSMENT AND PLAN:  COVID-19. Resident's respiratory status has been stable. She has not had any hypoxic episodes or episodes of respiratory distress.   We will continue to monitor closely. I have reviewed this resident's medication and treatment plan as well as most recent laboratory work. No further changes will be made at this time. Return in about 1 day (around 11/14/2020). Electronically Signed By: Erica Walsh. Abel Moreno CNP on 12/13/2020 10:26:09  ______________________________  Erica Walsh.  Abel Moreno CNP  [id number]  D: 12/12/2020 23:37:00  T: 12/13/2020 00:41:20    cc: - [facility]

## 2020-12-14 NOTE — PROGRESS NOTES
11/15/2020    [facility] [address]      TELEHEALTH EVALUATION -- Audio/Visual (During BFJZH-98 public health emergency)    HPI:    Jeffry Davis (:  1931) has requested an audio/video evaluation for the following concern(s):      visit for CC of COVID-19. Pt/Nurse noted  they had c/o  Increased confusion. Is worsened by covid, relieved by nothin. Review of Systems  ROS: Nurse/pt reports   Had decrease in appetite, and low grade fever, has resolved is now weaker. Respiratory: Nurse listened to lungs, no SOB, dyspnea,  hypoxia  Cardiovascular: Nurse listened to heart sounds, no observed PND, orthopnea       Prior to Visit Medications    Medication Sig Taking?  Authorizing Provider   enoxaparin (LOVENOX) 30 MG/0.3ML injection Inject into the skin 0.3mL every 12 hours  Historical Provider, MD   vitamin C (ASCORBIC ACID) 500 MG tablet Take 500 mg by mouth 2 times daily  Historical Provider, MD   zinc sulfate (ZINCATE) 220 (50 Zn) MG capsule Take 50 mg by mouth daily  Historical Provider, MD   acetaminophen (TYLENOL) 325 MG tablet Take 650 mg by mouth 3 times daily  Historical Provider, MD   amLODIPine (NORVASC) 5 MG tablet Take 5 mg by mouth daily   Historical Provider, MD   aspirin 81 MG tablet Take 81 mg by mouth daily  Historical Provider, MD   atorvastatin (LIPITOR) 10 MG tablet Take 10 mg by mouth daily  Historical Provider, MD   nebivolol (BYSTOLIC) 10 MG tablet Take 10 mg by mouth daily  Historical Provider, MD   Calcium 600-200 MG-UNIT TABS Take 1 tablet by mouth daily   Historical Provider, MD   Multiple Vitamins-Minerals (CENTRUM SILVER) TABS Take 1 tablet by mouth daily   Historical Provider, MD   donepezil (ARICEPT) 5 MG tablet Take 5 mg by mouth nightly  Historical Provider, MD   levothyroxine (SYNTHROID) 25 MCG tablet Take 25 mcg by mouth See Admin Instructions ;  GIVE 12.5 MG BY MOUTH ALL OTHER DAYS  Historical Provider, MD lisinopril (PRINIVIL;ZESTRIL) 40 MG tablet Take 20 mg by mouth daily   Historical Provider, MD   memantine ER (NAMENDA XR) 14 MG CP24 extended release capsule Take 14 mg by mouth daily  Historical Provider, MD   nitroGLYCERIN (NITROSTAT) 0.4 MG SL tablet Place 0.4 mg under the tongue every 5 minutes as needed for Chest pain up to max of 3 total doses. If no relief after 1 dose, call 911.   Historical Provider, MD   Cholecalciferol (VITAMIN D3) 1000 units TABS Take 1,000 Units by mouth daily   Historical Provider, MD   acetaminophen (TYLENOL) 325 MG tablet Take 650 mg by mouth daily as needed for Pain  Historical Provider, MD   Calcium 500 MG CHEW chewable tablet Take 1,000 mg by mouth 3 times daily as needed  Historical Provider, MD       Social History     Tobacco Use    Smoking status: Never Smoker    Smokeless tobacco: Never Used   Substance Use Topics    Alcohol use: No    Drug use: No        Allergies   Allergen Reactions    Amoxil [Amoxicillin]     Calcium Citrate     Noroxin [Norfloxacin]     Tiazac [Diltiazem]    ,   Past Medical History:   Diagnosis Date    Alzheimer's dementia     Anxiety     CAD (coronary artery disease)     Dementia     Hyperlipidemia     Hypertension     Hypothyroidism     Melanoma ([facility] [address]     skin    Osteoarthritis        PHYSICAL EXAMINATION:  [ INSTRUCTIONS:  \"[x]\" Indicates a positive item  \"[]\" Indicates a negative item  -- DELETE ALL ITEMS NOT EXAMINED]  Vital Signs: (As obtained by patient/caregiver or practitioner observation)    BP (!) 96/58   Pulse 72   Temp 97.5 °F (36.4 °C)   Resp 16   SpO2 98%       Constitutional: [x] Appears well-developed and well-nourished [x] No apparent distress    Up in wheel chair   Mental status  [x] Alert and awake  CONFUSED AT BASELINE    Pulmonary/Chest: nurse reports : Heart sounds are  regular  Lungs  sounds are clear   [x] Respiratory effort normal.  [x] No visualized signs of difficulty breathing or respiratory distress Other pertinent observable physical exam findings-     ASSESSMENT/PLAN:   Diagnosis Orders   1. Confusion     2. COVID-19     1. Looks good, except for increased confusion has recovered from [place], will continue assessment of labs as needed  2. Resolving , consider d/c meds soon   medications:  Vit D3 1000 units po bid  VIt C 500mg po bid  Zinc 50mg po daily  Pepcid 20mg po daily IF NOT on PPI    Continue same meds and POC    Return if symptoms worsen or fail to improve. Darron Perea is a 80 y.o. female being evaluated by a Virtual Visit (video visit) encounter to address concerns as mentioned above. A caregiver was present when appropriate. Due to this being a TeleHealth encounter (During [facility]), evaluation of the following organ systems was limited: Vitals/Constitutional/EENT/Resp/CV/GI//MS/Neuro/Skin/Heme-Lymph-Imm. Pursuant to the emergency declaration under the [address] [place] [address] authority and the [employer] Act, this Virtual Visit was conducted with patient's (and/or legal guardian's) consent, to reduce the patient's risk of exposure to COVID-19 and provide necessary medical care. The patient (and/or legal guardian) has also been advised to contact this office for worsening conditions or problems, and seek emergency medical treatment and/or call 911 if deemed necessary. Patient identification was verified at the start of the visit: Yes    Total time spent on this encounter: Not billed by time    Services were provided through a video synchronous discussion virtually to substitute for in-person clinic visit, and they agree to continue with assistance of [facility] staff and via VIRTUAL platform Patient and provider were located at their individual homes. --THAO Villanueva - CNP on 12/13/2020 at 8:20 PM    An electronic signature was used to authenticate this note.

## 2020-12-22 LAB
BACTERIA: ABNORMAL /HPF
BILIRUBIN URINE: NEGATIVE
BLOOD, URINE: NEGATIVE
CLARITY: ABNORMAL
COLOR: YELLOW
CRYSTALS, UA: ABNORMAL /HPF
EPITHELIAL CELLS, UA: ABNORMAL /HPF (ref 0–5)
GLUCOSE URINE: NEGATIVE MG/DL
HYALINE CASTS: ABNORMAL /HPF (ref 0–5)
KETONES, URINE: NEGATIVE MG/DL
LEUKOCYTE ESTERASE, URINE: ABNORMAL
NITRITE, URINE: POSITIVE
PH UA: 5.5 (ref 5–9)
PROTEIN UA: ABNORMAL MG/DL
RBC UA: ABNORMAL /HPF (ref 0–2)
SPECIFIC GRAVITY UA: 1.03 (ref 1–1.03)
UROBILINOGEN, URINE: 0.2 E.U./DL
WBC UA: ABNORMAL /HPF (ref 0–5)

## 2020-12-24 LAB
ORGANISM: ABNORMAL
URINE CULTURE, ROUTINE: ABNORMAL

## 2020-12-28 NOTE — PROGRESS NOTES
[address]  Chief Complaint   Patient presents with    Positive For Covid-19    Other     hypoxia       REASON FOR VISIT:  The patient is being seen today for acute visit for COVID-19 with hypoxia. SUBJECTIVE:  Nursing staff report resident's pulse ox has dropped to 92. Otherwise, no concerns. FAMILY AND SOCIAL HISTORY:  Refer to H&P. Past Medical History:   Diagnosis Date    Alzheimer's dementia     Anxiety     CAD (coronary artery disease)     Dementia     Hyperlipidemia     Hypertension     Hypothyroidism     Melanoma ([address]     skin    Osteoarthritis        MEDICATIONS AND ALLERGIES:  Reviewed on chart at nursing facility. REVIEW OF SYSTEMS:  Resident denies any headache, dizziness, blurred vision, chest pain, shortness of breath, sore throat, cough, abdominal pain, nausea, vomiting, or changes in her bowel or bladder habits. She reports she is eating well, sleeping well, and has no pain. PHYSICAL EXAMINATION:  Most recent vital signs:  /62, temp 97.8, heart rate 61, respirations 16, pulse oxing 92% per nursing staff, 98% on room air during my visit. CONSTITUTIONAL:  Resident is alert, elderly female, in no apparent distress, pleasant and cooperative with exam.  INTEGUMENT:  Pink, warm, dry. HEENT:  Normocephalic, atraumatic. Slightly hard of hearing. Conjunctivae pink. Sclerae nonicteric. Mucous membranes pink, moist.  No oropharyngeal exudates. NECK:  Supple. No cervical or clavicular lymphadenopathy. CARDIOVASCULAR:  Regular rate and rhythm. No murmurs, gallops, or rubs noted. RESPIRATORY:  Lung sounds are clear throughout all fields. Respirations even, nonlabored. No use of accessory muscles with breathing. No cough noted. ABDOMEN:  Soft, nontender, nondistended. Normoactive bowel sounds. PV:  Peripheral pulses present. No edema noted. ASSESSMENT AND PLAN:  1. COVID with hypoxia.   I will order O2 p.r.n. to titrate to 94% or greater, if she drops further. She is currently in no apparent distress. Pulse oxing 98% on room air. We will continue to monitor her closely. I have reviewed this resident's medication and treatment plan, as well as, most recent lab work. No further changes will be made at this time. Return in about 1 day (around 11/17/2020). ________________________    Tamela Argueta [facility] [place], [address] [place] [address]  Office [phone]  Fax [phone]  Cc.  Mell

## 2020-12-29 PROBLEM — R09.02 HYPOXIA: Status: ACTIVE | Noted: 2020-12-29

## 2020-12-30 NOTE — PROGRESS NOTES
[address]  Chief Complaint   Patient presents with    Positive For Covid-19       REASON FOR VISIT:  The patient is being seen today for acute visit for COVID-19. SUBJECTIVE:  Resident offers no concerns. Nursing staff report resident remains at her baseline. FAMILY AND SOCIAL HISTORY:  Refer to H and P. Past Medical History:   Diagnosis Date    Alzheimer's dementia     Anxiety     CAD (coronary artery disease)     Dementia     Hyperlipidemia     Hypertension     Hypothyroidism     Melanoma ([address]     skin    Osteoarthritis        MEDICATIONS AND ALLERGIES:  Reviewed on chart at nursing facility. REVIEW OF SYSTEMS:  Resident denies any headache, dizziness, blurred vision, sore throat, cough, chest pain, shortness of breath, abdominal pain, nausea, vomiting, or changes in her bowel or bladder habits. She reports she is eating well, sleeping well, and has no pain. PHYSICAL EXAMINATION:  Most recent vital signs:  /65, temp 98.5, heart rate 75, respirations 18, pulse oxing 96% on room air, weight 139. CONSTITUTIONAL:  Resident is alert, elderly female, in no apparent distress, pleasant and cooperative with exam.  INTEGUMENT:  Pink, warm, dry. HEENT:  Normocephalic, atraumatic. Hard of hearing. Conjunctivae pink. Sclerae nonicteric. Mucous membranes pink, moist.  No oropharyngeal exudates. NECK:  Supple. No cervical or clavicular lymphadenopathy. CARDIOVASCULAR:  Regular rate and rhythm. No murmurs, gallops, or rubs noted. RESPIRATORY:  Lung sounds clear through all fields. Respirations even, unlabored. No use of accessory muscles with breathing. No cough noted. ABDOMEN:  Soft, nontender, nondistended. Normoactive bowel sounds. PV:  Peripheral pulses present. No edema noted. ASSESSMENT AND PLAN:    1. COVID-19:  Resident's respiratory status is stable.   She has not had any hypoxic episodes or episodes of respiratory distress or use of accessory muscles with breathing. She is currently pulse oxing at 96% on room air. I have reviewed this resident's medication and treatment plan as well as most recent lab work. No further changes will be made at this time. Return in about 1 day (around 11/7/2020). Electronically Signed By: Holden Murphy. Arin Woods CNP on 12/30/2020 15:57:14  ______________________________  Holden Murphy.  Arin Woods CNP  [id number]  D: 12/29/2020 13:43:08  T: 12/29/2020 14:49:23    cc: - [facility]

## 2021-01-03 NOTE — PROGRESS NOTES
[address]  Chief Complaint   Patient presents with    Positive For Covid-19       The patient is being seen today for acute visit for COVID-19. SUBJECTIVE:  Nursing staff report resident remains at her baseline. FAMILY AND SOCIAL HISTORY:  Refer to H&P. Past Medical History:   Diagnosis Date    Alzheimer's dementia     Anxiety     CAD (coronary artery disease)     Dementia     Hyperlipidemia     Hypertension     Hypothyroidism     Melanoma ([address]     skin    Osteoarthritis        MEDICATIONS AND ALLERGIES:  Reviewed on chart at nursing facility. REVIEW OF SYSTEMS:  Resident denies any headache, dizziness, blurred vision, chest pain, shortness of breath, abdominal pain, nausea, vomiting, sore throat, cough, or changes in her bowel or bladder habits. She reports she is eating well, sleeping well, and has no pain. PHYSICAL EXAMINATION:  Most recent vital signs:  /60, temp 98.4, heart rate 72, respirations 16, pulse oxing 95% on room air. Constitutional:  Resident is alert, elderly female, in no apparent distress, pleasant and cooperative with exam.  Integument:  Pink, warm, dry. HEENT:  Normocephalic, atraumatic. Conjunctivae pink. Sclerae nonicteric. Mucous membranes pink, moist.  No oropharyngeal exudate. Neck:  Supple. No cervical or clavicular lymphadenopathy. Cardiovascular:  Regular rate and rhythm. No murmurs, gallops, rubs noted. Respiratory:  Lung sounds clear through all fields. Respirations even, nonlabored. No use of accessory muscles with breathing noted. No distress noted. Abdomen:  Soft, nontender, nondistended, normoactive bowel sounds. PV:  Peripheral pulses present. No edema noted. ASSESSMENT AND PLAN:  COVID-19:  Resident has remained asymptomatic. Respiratory status stable. She has not had any hypoxic episodes or episodes of respiratory distress.   She is not using accessory muscles with her breathing and she is pulse oxing 95% on room air. We will continue to monitor the resident closely. No further changes will be made at this time. Return in about 1 day (around 11/10/2020). Electronically Signed By: Juany Martinez CNP on 01/03/2021 15:48:42  ______________________________  Juany Martinez CNP  [id number]  D: 01/03/2021 12:38:40  T: 01/03/2021 14:02:29    cc: - [facility]

## 2021-01-03 NOTE — PROGRESS NOTES
[address]  Chief Complaint   Patient presents with    Positive For Covid-19       The patient is being seen today for acute visit for COVID-19. SUBJECTIVE:  Resident offers no concerns. Nursing staff report resident remains at her baseline. FAMILY AND SOCIAL HISTORY:  Refer to H&P. Past Medical History:   Diagnosis Date    Alzheimer's dementia     Anxiety     CAD (coronary artery disease)     Dementia     Hyperlipidemia     Hypertension     Hypothyroidism     Melanoma ([address]     skin    Osteoarthritis        MEDICATIONS AND ALLERGIES:  Reviewed on chart at nursing facility. REVIEW OF SYSTEMS:  Resident denies any headache, dizziness, blurred vision, sore throat, cough, shortness of breath, chest pain, abdominal pain, nausea, vomiting, or changes in her bowel or bladder habits. She reports she is eating okay, sleeping okay, and has no pain. PHYSICAL EXAMINATION:  Most recent vital signs:  /58, temp 97.8, heart rate 67, respirations 18, pulse oxing 94 to 95% on room air, weight 139. Constitutional:  Resident is alert, elderly female, in no apparent distress, pleasant and cooperative with exam.  Integument:  Pink, warm, dry. HEENT:  Normocephalic, atraumatic. Hard of hearing. Conjunctivae pink. Sclerae nonicteric. Mucous membranes pink, moist.  No oropharyngeal exudate. Neck:  Supple. No cervical or clavicular lymphadenopathy. Cardiovascular:  Regular rate and rhythm. No murmurs, gallops, rubs noted. Respiratory:  Lung sounds clear through all fields. Respirations even, nonlabored. No use of accessory muscles with breathing. No cough noted. Currently pulse oxing 94 to 95% on room air. Abdomen:  Soft, nontender, nondistended, normoactive bowel sounds. PV:  Peripheral pulses present. No edema noted. ASSESSMENT AND PLAN:  COVID-19:  Resident remains stable in her course. She has not had any hypoxic episodes or episodes of respiratory distress.   No use of accessory muscles with breathing. She is currently pulse oxing 94 to 95% on room air. She has no other associated symptoms. I have reviewed this resident's medication and treatment plan as well as most recent lab work. No further changes will be made at this time. Return in about 1 day (around 11/12/2020). Electronically Signed By: Edelmira Isbell CNP on 01/02/2021 14:42:44  ______________________________  Edelmira Isbell CNP  [id number]  D: 12/30/2020 19:32:18  T: 12/30/2020 20:37:18    cc: - [facility]

## 2021-01-11 ENCOUNTER — OFFICE VISIT (OUTPATIENT)
Dept: GERIATRIC MEDICINE | Age: 86
End: 2021-01-11
Payer: MEDICARE

## 2021-01-11 DIAGNOSIS — N76.4 FURUNCLE OF LABIA MAJORA: Primary | ICD-10-CM

## 2021-01-11 DIAGNOSIS — R19.7 DIARRHEA, UNSPECIFIED TYPE: ICD-10-CM

## 2021-01-20 ENCOUNTER — OFFICE VISIT (OUTPATIENT)
Dept: GERIATRIC MEDICINE | Age: 86
End: 2021-01-20
Payer: MEDICARE

## 2021-01-20 DIAGNOSIS — R19.7 DIARRHEA, UNSPECIFIED TYPE: Primary | ICD-10-CM

## 2021-01-20 LAB — C DIFF TOXIN/ANTIGEN: NORMAL

## 2021-01-21 LAB
ANION GAP SERPL CALCULATED.3IONS-SCNC: 14 MEQ/L (ref 9–15)
BASOPHILS ABSOLUTE: 0 K/UL (ref 0–0.2)
BASOPHILS RELATIVE PERCENT: 0.7 %
BUN BLDV-MCNC: 18 MG/DL (ref 8–23)
CALCIUM SERPL-MCNC: 10.6 MG/DL (ref 8.5–9.9)
CHLORIDE BLD-SCNC: 99 MEQ/L (ref 95–107)
CHOLESTEROL, TOTAL: 154 MG/DL (ref 0–199)
CO2: 27 MEQ/L (ref 20–31)
CREAT SERPL-MCNC: 0.66 MG/DL (ref 0.5–0.9)
EOSINOPHILS ABSOLUTE: 0.2 K/UL (ref 0–0.7)
EOSINOPHILS RELATIVE PERCENT: 2.8 %
GFR AFRICAN AMERICAN: >60
GFR NON-AFRICAN AMERICAN: >60
GLUCOSE BLD-MCNC: 117 MG/DL (ref 70–99)
HCT VFR BLD CALC: 39.7 % (ref 37–47)
HDLC SERPL-MCNC: 40 MG/DL (ref 40–59)
HEMOGLOBIN: 12.9 G/DL (ref 12–16)
LDL CHOLESTEROL CALCULATED: 67 MG/DL (ref 0–129)
LYMPHOCYTES ABSOLUTE: 2.4 K/UL (ref 1–4.8)
LYMPHOCYTES RELATIVE PERCENT: 37.7 %
MCH RBC QN AUTO: 30.7 PG (ref 27–31.3)
MCHC RBC AUTO-ENTMCNC: 32.5 % (ref 33–37)
MCV RBC AUTO: 94.3 FL (ref 82–100)
MONOCYTES ABSOLUTE: 0.5 K/UL (ref 0.2–0.8)
MONOCYTES RELATIVE PERCENT: 7.4 %
NEUTROPHILS ABSOLUTE: 3.3 K/UL (ref 1.4–6.5)
NEUTROPHILS RELATIVE PERCENT: 51.4 %
PDW BLD-RTO: 14.9 % (ref 11.5–14.5)
PLATELET # BLD: 259 K/UL (ref 130–400)
POTASSIUM SERPL-SCNC: 4.7 MEQ/L (ref 3.4–4.9)
RBC # BLD: 4.21 M/UL (ref 4.2–5.4)
SODIUM BLD-SCNC: 140 MEQ/L (ref 135–144)
TRIGL SERPL-MCNC: 234 MG/DL (ref 0–150)
TSH SERPL DL<=0.05 MIU/L-ACNC: 0.73 UIU/ML (ref 0.44–3.86)
WBC # BLD: 6.4 K/UL (ref 4.8–10.8)

## 2021-01-22 LAB — PARATHYROID HORMONE INTACT: 20.7 PG/ML (ref 15–65)

## 2021-01-28 PROBLEM — R19.7 DIARRHEA: Status: ACTIVE | Noted: 2021-01-28

## 2021-01-28 PROBLEM — N76.4 FURUNCLE OF LABIA MAJORA: Status: ACTIVE | Noted: 2021-01-28

## 2021-01-29 NOTE — PROGRESS NOTES
[id number] [address]  Chief Complaint   Patient presents with    Other     mass labia    Diarrhea       REASON FOR VISIT:  The patient is being seen today for acute visit for mass right labia and diarrhea. SUBJECTIVE:  Resident is confused at her baseline and offers no concerns. Nursing staff report that resident did have an episode of stool incontinence and when nursing staff were cleaning her up they noticed a bump on her right labia. Otherwise, no concerns. FAMILY AND SOCIAL HISTORY:  Refer to H&P. Past Medical History:   Diagnosis Date    Alzheimer's dementia     Anxiety     CAD (coronary artery disease)     Dementia     Hyperlipidemia     Hypertension     Hypothyroidism     Melanoma ([facility]     skin    Osteoarthritis        MEDICATIONS AND ALLERGIES:  Reviewed on chart at nursing facility. REVIEW OF SYSTEMS:  Resident is confused at her baseline, but she offers no concerns. She states that she is not having any pain in her vaginal area and she does not know what the nursing staff were talking about. Otherwise, no concerns. PHYSICAL EXAMINATION:  Most recent vital signs:  Temp 97.7, respirations 21, pulse oxing 95% on room air, weight 134. CONSTITUTIONAL:  Resident is alert, elderly female, confused at her baseline, in no apparent distress. INTEGUMENT:  Pink, warm, dry. HEENT:  Normocephalic, atraumatic. Conjunctivae pink. Sclerae nonicteric. Mucous membranes pink, moist.  No oropharyngeal exudates. NECK:  Supple. No cervical or clavicular lymphadenopathy. CARDIOVASCULAR:  Regular rate and rhythm. No murmurs, gallops, or rubs noted. RESPIRATORY:  Lung sounds are clear throughout all fields. Respirations even, nonlabored. ABDOMEN:  Soft, nontender, nondistended. Normoactive bowel sounds. PV:  Peripheral pulses present. No edema noted. GYN:  Accompanied with nursing staff, I did examine her genitalia.   There was a small half pea-sized bump noted on the aspect of the right labia. No erythema noted. No swelling noted. No discharge noted. Nontender to touch. Easily movable. ASSESSMENT AND PLAN:  1. Mass right labia. I will have nursing staff apply warm compresses t.i.d. x3 days to see if it will come to a head, it could be a furuncle. However, it does not appear to be irritated or infected. We will have nursing staff continue to monitor. 2.   Diarrhea. Nursing staff report resident has been having episodes of loose stools, so I will order stool for C. diff. I have reviewed this resident's medication and treatment plan, as well as, most recent lab work. No further changes will be made at this time. Return in about 3 months (around 4/11/2021), or if symptoms worsen or fail to improve, for chronic conditions. Electronically Signed By: Juany Martinez CNP on 01/28/2021 20:33:33  ______________________________  Juany Martinez CNP  /[id number]  D: 01/25/2021 23:40:28  T: 01/26/2021 00:26:36    cc: - [facility]

## 2021-02-04 ENCOUNTER — OFFICE VISIT (OUTPATIENT)
Dept: GERIATRIC MEDICINE | Age: 86
End: 2021-02-04
Payer: MEDICARE

## 2021-02-04 DIAGNOSIS — I10 ESSENTIAL HYPERTENSION: ICD-10-CM

## 2021-02-04 DIAGNOSIS — E03.9 HYPOTHYROIDISM, UNSPECIFIED TYPE: ICD-10-CM

## 2021-02-04 DIAGNOSIS — F02.80 LATE ONSET ALZHEIMER'S DISEASE WITHOUT BEHAVIORAL DISTURBANCE (HCC): Primary | ICD-10-CM

## 2021-02-04 DIAGNOSIS — G30.1 LATE ONSET ALZHEIMER'S DISEASE WITHOUT BEHAVIORAL DISTURBANCE (HCC): Primary | ICD-10-CM

## 2021-02-08 VITALS
HEART RATE: 67 BPM | TEMPERATURE: 97.7 F | BODY MASS INDEX: 29.25 KG/M2 | RESPIRATION RATE: 20 BRPM | DIASTOLIC BLOOD PRESSURE: 52 MMHG | OXYGEN SATURATION: 94 % | SYSTOLIC BLOOD PRESSURE: 117 MMHG | WEIGHT: 134 LBS

## 2021-02-08 NOTE — PROGRESS NOTES
[address] [address]  Chief Complaint   Patient presents with    Diarrhea       REASON FOR VISIT:  The patient is being seen today for acute visit for diarrhea. Nursing staff report resident had incident where she had loose stool in bed, which is not normal for her. Otherwise, no concerns. FAMILY AND SOCIAL HISTORY:  Refer to H&P. Past Medical History:   Diagnosis Date    Alzheimer's dementia     Anxiety     CAD (coronary artery disease)     Dementia     Hyperlipidemia     Hypertension     Hypothyroidism     Melanoma ([address]     skin    Osteoarthritis        MEDICATIONS AND ALLERGIES:  Reviewed on chart at nursing facility. REVIEW OF SYSTEMS:  Resident denies any headache, dizziness, blurred vision, chest pain, shortness of breath, abdominal pain, nausea, vomiting, or changes in her bladder habits. She does report she has been having frequent diarrhea. Otherwise, no concerns. PHYSICAL EXAMINATION:  Most recent vital signs:  /52, temp 97.7, heart rate 67, respirations 20, pulse oxing 94% on room air, weight 134. CONSTITUTIONAL:  Resident is alert, elderly, confused female, in no apparent distress, pleasant and cooperative with exam.  INTEGUMENT:  Pink, warm, dry. HEENT:  Normocephalic, atraumatic. Conjunctivae pink. Sclerae nonicteric. Mucous membranes pink, moist.  No oropharyngeal exudates. NECK:  Supple. No cervical or clavicular lymphadenopathy. CARDIOVASCULAR:  Regular rate and rhythm. No murmurs, gallops, or rubs noted. RESPIRATORY:  Lung sounds are clear throughout all fields. Respirations even, nonlabored. ABDOMEN:  Soft, nontender, nondistended. Normoactive bowel sounds. PV:  Peripheral pulses present. No edema noted. ASSESSMENT AND PLAN:  1. Diarrhea. Resident did have stool for C. diff sent, which came back negative. We will do a BMP, CBC with diff, TSH, lipid panel in the a.m.   If resident is on any laxative or stool softeners, I will have nursing staff hold for loose stools. No further changes will be made at this time. Return in about 3 months (around 4/20/2021), or if symptoms worsen or fail to improve, for chronic conditions. Electronically Signed By: Minh Chapman CNP on 02/07/2021 00:56:35  ______________________________  Minh Chapman CNP  [id number]  D: 02/06/2021 18:52:04  T: 02/06/2021 19:52:30    cc: - [facility]

## 2021-02-11 ENCOUNTER — OFFICE VISIT (OUTPATIENT)
Dept: GERIATRIC MEDICINE | Age: 86
End: 2021-02-11
Payer: MEDICARE

## 2021-02-11 DIAGNOSIS — N90.7 LABIAL CYST: Primary | ICD-10-CM

## 2021-02-11 DIAGNOSIS — G30.1 LATE ONSET ALZHEIMER'S DISEASE WITHOUT BEHAVIORAL DISTURBANCE (HCC): ICD-10-CM

## 2021-02-11 DIAGNOSIS — F02.80 LATE ONSET ALZHEIMER'S DISEASE WITHOUT BEHAVIORAL DISTURBANCE (HCC): ICD-10-CM

## 2021-02-11 DIAGNOSIS — R41.0 CONFUSION: ICD-10-CM

## 2021-02-12 LAB
BACTERIA: ABNORMAL /HPF
BILIRUBIN URINE: NEGATIVE
BLOOD, URINE: NEGATIVE
CLARITY: ABNORMAL
COLOR: YELLOW
EPITHELIAL CELLS, UA: ABNORMAL /HPF (ref 0–5)
GLUCOSE URINE: NEGATIVE MG/DL
HYALINE CASTS: ABNORMAL /HPF (ref 0–5)
KETONES, URINE: NEGATIVE MG/DL
LEUKOCYTE ESTERASE, URINE: ABNORMAL
NITRITE, URINE: NEGATIVE
PH UA: 6 (ref 5–9)
PROTEIN UA: NEGATIVE MG/DL
RBC UA: ABNORMAL /HPF (ref 0–2)
SPECIFIC GRAVITY UA: 1.02 (ref 1–1.03)
UROBILINOGEN, URINE: 0.2 E.U./DL
WBC UA: ABNORMAL /HPF (ref 0–5)

## 2021-02-14 LAB
ORGANISM: ABNORMAL
URINE CULTURE, ROUTINE: ABNORMAL

## 2021-02-27 NOTE — PROGRESS NOTES
[address] [address]  Chief Complaint   Patient presents with    Cyst     labial    Dementia    Altered Mental Status       REASON FOR VISIT:  The patient is being seen today for acute visit for reports of labial cyst and increased confusion. SUBJECTIVE:  Nursing staff reports that resident is still noted to have a bump on her labia. It does not appear to be improving. She is complaining of some discomfort in that area. Otherwise, nursing staff report resident appears to be more confused than normal.  Otherwise, no concerns. FAMILY AND SOCIAL HISTORY:  Refer to H&P. Past Medical History:   Diagnosis Date    Alzheimer's dementia     Anxiety     CAD (coronary artery disease)     Dementia     Hyperlipidemia     Hypertension     Hypothyroidism     Melanoma ([facility]     skin    Osteoarthritis        MEDICATIONS AND ALLERGIES:  Reviewed on chart at nursing facility. REVIEW OF SYSTEMS:  Cannot be obtained due to resident's cognition level. She is not able to offer coherent narrative. PHYSICAL EXAMINATION:  VITAL SIGNS:  Stable, afebrile per nursing staff. CONSTITUTIONAL:  Resident is alert, elderly, confused female, in no apparent distress. INTEGUMENT:  Pink, warm, dry. HEENT:  Normocephalic, atraumatic. Hard of hearing. Conjunctivae pink. Sclerae nonicteric. Mucous membranes pink, moist.  No oropharyngeal exudates. NECK:  Supple. No cervical or clavicular lymphadenopathy. CARDIOVASCULAR:  Resident's heart rate is regular rate and rhythm. No murmurs, gallops, or rubs, noted. RESPIRATORY:  Lung sounds are clear throughout. Respirations even, nonlabored. ABDOMEN:  Soft, nontender, nondistended. Normoactive bowel sounds. GENITALIA:  Not observed at the time of my visit as resident is eating lunch. Nursing staff report when aide was cleaning the patient this morning, a lump was noted on her labia unchanged from my prior visit. PV:  Peripheral pulses present.   No edema noted.    ASSESSMENT AND PLAN:  1. Labial cyst.  I will order lidocaine 3% topical cream due to the patient's report of discomfort. We will have nursing staff continue warm soaks. They do report they are having difficulty due to resident's cognition with her being compliant with the soaks. If there is no improvement in the resident's symptoms, we will refer to GYN if the family is agreeable. Otherwise, we will continue to monitor. 2.   Dementia with increased confusion. I will order UA C&S. Nursing staff may straight cath to obtain. No further changes will be made at this time. We will continue to monitor the resident for overall comfort, function, and safety. Return in about 3 months (around 5/11/2021), or if symptoms worsen or fail to improve, for chronic conditions. Electronically Signed By: Chilo Yu CNP on 02/27/2021 00:55:38  ______________________________  Chilo Longoria.  Ruth Yu CNP  [id number]  D: 02/26/2021 08:55:13  T: 02/26/2021 09:55:45    cc: - [facility]

## 2021-02-28 PROBLEM — R41.0 CONFUSION: Status: ACTIVE | Noted: 2021-02-28

## 2021-02-28 PROBLEM — N90.7 LABIAL CYST: Status: ACTIVE | Noted: 2021-02-28

## 2021-03-05 ENCOUNTER — OFFICE VISIT (OUTPATIENT)
Dept: GERIATRIC MEDICINE | Age: 86
End: 2021-03-05
Payer: MEDICARE

## 2021-03-05 DIAGNOSIS — G30.1 LATE ONSET ALZHEIMER'S DISEASE WITHOUT BEHAVIORAL DISTURBANCE (HCC): Primary | ICD-10-CM

## 2021-03-05 DIAGNOSIS — I10 ESSENTIAL HYPERTENSION: ICD-10-CM

## 2021-03-05 DIAGNOSIS — M15.9 OSTEOARTHRITIS OF MULTIPLE JOINTS, UNSPECIFIED OSTEOARTHRITIS TYPE: ICD-10-CM

## 2021-03-05 DIAGNOSIS — F02.80 LATE ONSET ALZHEIMER'S DISEASE WITHOUT BEHAVIORAL DISTURBANCE (HCC): Primary | ICD-10-CM

## 2021-03-07 NOTE — PROGRESS NOTES
Patient Name: Estella Wolf  Date: 3/6/2021  YOB: 1931  Medical Record Number: [id number]              History of Present Illness:      Review of Systems    Review of Systems: All 14 review of systems negative other than as stated above    Social History     Tobacco Use    Smoking status: Never Smoker    Smokeless tobacco: Never Used   Substance Use Topics    Alcohol use: No    Drug use: No         Past Medical History:   Diagnosis Date    Alzheimer's dementia     Anxiety     CAD (coronary artery disease)     Dementia     Hyperlipidemia     Hypertension     Hypothyroidism     Melanoma ([address]     skin    Osteoarthritis            Past Surgical History:   Procedure Laterality Date    BACK SURGERY      BREAST SURGERY      biopsy    CORONARY ANGIOPLASTY WITH STENT PLACEMENT      HYSTERECTOMY  1960         Current Outpatient Medications on File Prior to Visit   Medication Sig Dispense Refill    enoxaparin (LOVENOX) 30 MG/0.3ML injection Inject into the skin 0.3mL every 12 hours      vitamin C (ASCORBIC ACID) 500 MG tablet Take 500 mg by mouth 2 times daily      zinc sulfate (ZINCATE) 220 (50 Zn) MG capsule Take 50 mg by mouth daily      acetaminophen (TYLENOL) 325 MG tablet Take 650 mg by mouth 3 times daily      amLODIPine (NORVASC) 5 MG tablet Take 5 mg by mouth daily       aspirin 81 MG tablet Take 81 mg by mouth daily      atorvastatin (LIPITOR) 10 MG tablet Take 10 mg by mouth daily      nebivolol (BYSTOLIC) 10 MG tablet Take 10 mg by mouth daily      Calcium 600-200 MG-UNIT TABS Take 1 tablet by mouth daily       Multiple Vitamins-Minerals (CENTRUM SILVER) TABS Take 1 tablet by mouth daily       donepezil (ARICEPT) 5 MG tablet Take 5 mg by mouth nightly      levothyroxine (SYNTHROID) 25 MCG tablet Take 25 mcg by mouth See Admin Instructions MONDAYS;  GIVE 12.5 MG BY MOUTH ALL OTHER DAYS      lisinopril (PRINIVIL;ZESTRIL) 40 MG tablet Take 20 mg by mouth daily       memantine ER (NAMENDA XR) 14 MG CP24 extended release capsule Take 14 mg by mouth daily      nitroGLYCERIN (NITROSTAT) 0.4 MG SL tablet Place 0.4 mg under the tongue every 5 minutes as needed for Chest pain up to max of 3 total doses. If no relief after 1 dose, call 911.  Cholecalciferol (VITAMIN D3) 1000 units TABS Take 1,000 Units by mouth daily       acetaminophen (TYLENOL) 325 MG tablet Take 650 mg by mouth daily as needed for Pain      Calcium 500 MG CHEW chewable tablet Take 1,000 mg by mouth 3 times daily as needed       No current facility-administered medications on file prior to visit. Allergies   Allergen Reactions    Amoxil [Amoxicillin]     Calcium Citrate     Noroxin [Norfloxacin]     Tiazac [Diltiazem]          Family History   Problem Relation Age of Onset    Cancer Brother          Physical Exam:      Physical Exam    There were no vitals taken for this visit. .   Lab Results   Component Value Date    WBC 6.4 01/21/2021    HGB 12.9 01/21/2021    HCT 39.7 01/21/2021    MCV 94.3 01/21/2021     01/21/2021     Lab Results   Component Value Date     01/21/2021    K 4.7 01/21/2021    CL 99 01/21/2021    CO2 27 01/21/2021    BUN 18 01/21/2021    CREATININE 0.66 01/21/2021    GLUCOSE 117 01/21/2021    GLUCOSE 171 09/03/2020    CALCIUM 10.6 01/21/2021                ASSESSMENT:  Patient Active Problem List   Diagnosis    Hypothyroidism    Osteoarthritis    CAD (coronary artery disease)    Hyperlipidemia    Hypertension    Late onset Alzheimer's disease without behavioral disturbance (HCC)    Generalized anxiety disorder    Petechial rash    COVID-19    Hypoxia    Furuncle of labia majora    Diarrhea    Labial cyst    Confusion         PLAN:   Diagnosis Orders   1. Late onset Alzheimer's disease without behavioral disturbance ([facility]     2. Essential hypertension     3.  Hypothyroidism, unspecified type

## 2021-03-24 NOTE — PROGRESS NOTES
Patient Name: Nestor Mar  YOB: 1931  Medical Record Number: [id number]      History of Present Illness: This 61-year-old woman seen in her room today clinically stable patient has been globally weak no evidence of acute psychosis. Patient has not any change in his her bowel bladder habits. Patient has an anti-inflammatory started tolerating well. Patient systolic pressure has been intermittently elevated no orthostasis at this time.     Review of Systems   Unable to perform ROS: Dementia       Review of Systems: All 14 review of systems negative other than as stated above    Social History     Tobacco Use    Smoking status: Never Smoker    Smokeless tobacco: Never Used   Substance Use Topics    Alcohol use: No    Drug use: No         Past Medical History:   Diagnosis Date    Alzheimer's dementia     Anxiety     CAD (coronary artery disease)     Dementia     Hyperlipidemia     Hypertension     Hypothyroidism     Melanoma ([address]     skin    Osteoarthritis            Past Surgical History:   Procedure Laterality Date    BACK SURGERY      BREAST SURGERY      biopsy    CORONARY ANGIOPLASTY WITH STENT PLACEMENT      HYSTERECTOMY  1960         Current Outpatient Medications on File Prior to Visit   Medication Sig Dispense Refill    enoxaparin (LOVENOX) 30 MG/0.3ML injection Inject into the skin 0.3mL every 12 hours      vitamin C (ASCORBIC ACID) 500 MG tablet Take 500 mg by mouth 2 times daily      zinc sulfate (ZINCATE) 220 (50 Zn) MG capsule Take 50 mg by mouth daily      acetaminophen (TYLENOL) 325 MG tablet Take 650 mg by mouth 3 times daily      amLODIPine (NORVASC) 5 MG tablet Take 5 mg by mouth daily       aspirin 81 MG tablet Take 81 mg by mouth daily      atorvastatin (LIPITOR) 10 MG tablet Take 10 mg by mouth daily      nebivolol (BYSTOLIC) 10 MG tablet Take 10 mg by mouth daily      Calcium 600-200 MG-UNIT TABS Take 1 tablet by mouth daily       Multiple Vitamins-Minerals (CENTRUM SILVER) TABS Take 1 tablet by mouth daily       donepezil (ARICEPT) 5 MG tablet Take 5 mg by mouth nightly      levothyroxine (SYNTHROID) 25 MCG tablet Take 25 mcg by mouth See Admin Instructions MONDAYS;  GIVE 12.5 MG BY MOUTH ALL OTHER DAYS      lisinopril (PRINIVIL;ZESTRIL) 40 MG tablet Take 20 mg by mouth daily       memantine ER (NAMENDA XR) 14 MG CP24 extended release capsule Take 14 mg by mouth daily      nitroGLYCERIN (NITROSTAT) 0.4 MG SL tablet Place 0.4 mg under the tongue every 5 minutes as needed for Chest pain up to max of 3 total doses. If no relief after 1 dose, call 911.  Cholecalciferol (VITAMIN D3) 1000 units TABS Take 1,000 Units by mouth daily       acetaminophen (TYLENOL) 325 MG tablet Take 650 mg by mouth daily as needed for Pain      Calcium 500 MG CHEW chewable tablet Take 1,000 mg by mouth 3 times daily as needed       No current facility-administered medications on file prior to visit. Allergies   Allergen Reactions    Amoxil [Amoxicillin]     Calcium Citrate     Noroxin [Norfloxacin]     Tiazac [Diltiazem]          Family History   Problem Relation Age of Onset    Cancer Brother          Physical Exam:      Physical Exam   Constitutional: She appears well-nourished. No distress. HENT:   Head: Normocephalic and atraumatic. Eyes: EOM are normal.   Neck: Neck supple. No JVD present. Cardiovascular: Normal rate and regular rhythm. Exam reveals no friction rub. Pulmonary/Chest: Effort normal and breath sounds normal. She has no wheezes. Abdominal: Soft. Bowel sounds are normal. There is no abdominal tenderness. Musculoskeletal:         General: No tenderness or edema. Neurological: She is alert. Skin: Skin is warm and dry. No rash noted. Nursing note and vitals reviewed. There were no vitals taken for this visit.       .   Lab Results   Component Value Date    WBC 6.4 01/21/2021    HGB 12.9 01/21/2021    HCT 39.7

## 2021-04-01 ENCOUNTER — OFFICE VISIT (OUTPATIENT)
Dept: GERIATRIC MEDICINE | Age: 86
End: 2021-04-01
Payer: MEDICARE

## 2021-04-01 DIAGNOSIS — K59.00 CONSTIPATION, UNSPECIFIED CONSTIPATION TYPE: Primary | ICD-10-CM

## 2021-04-02 ENCOUNTER — OFFICE VISIT (OUTPATIENT)
Dept: GERIATRIC MEDICINE | Age: 86
End: 2021-04-02
Payer: MEDICARE

## 2021-04-02 DIAGNOSIS — F03.90 DEMENTIA WITHOUT BEHAVIORAL DISTURBANCE, UNSPECIFIED DEMENTIA TYPE: Primary | ICD-10-CM

## 2021-04-02 DIAGNOSIS — M15.9 OSTEOARTHRITIS OF MULTIPLE JOINTS, UNSPECIFIED OSTEOARTHRITIS TYPE: ICD-10-CM

## 2021-04-02 DIAGNOSIS — E03.9 HYPOTHYROIDISM, UNSPECIFIED TYPE: ICD-10-CM

## 2021-04-02 DIAGNOSIS — I10 ESSENTIAL HYPERTENSION: ICD-10-CM

## 2021-05-03 NOTE — PROGRESS NOTES
Patient Name: Queen Liane  YOB: 1931  Medical Record Number: [id number]      History of Present Illness:  Patient seen for follow-up after recent concern for constipation. Patient has not had new obstruction of bowel although nursing staff are concerned regarding output and single episode of nausea with emesis. Patient has not have the appearance of being obtunded. No bile is emesis. Patient herself is unaware of her last bowel movement. No bloody diarrhea or bright red bright red blood per rectum.     Review of Systems   Unable to perform ROS: Dementia       Review of Systems: All 14 review of systems negative other than as stated above    Social History     Tobacco Use    Smoking status: Never Smoker    Smokeless tobacco: Never Used   Substance Use Topics    Alcohol use: No    Drug use: No         Past Medical History:   Diagnosis Date    Alzheimer's dementia     Anxiety     CAD (coronary artery disease)     Dementia     Hyperlipidemia     Hypertension     Hypothyroidism     Melanoma ([address]     skin    Osteoarthritis            Past Surgical History:   Procedure Laterality Date    BACK SURGERY      BREAST SURGERY      biopsy    CORONARY ANGIOPLASTY WITH STENT PLACEMENT      HYSTERECTOMY  1960         Current Outpatient Medications on File Prior to Visit   Medication Sig Dispense Refill    enoxaparin (LOVENOX) 30 MG/0.3ML injection Inject into the skin 0.3mL every 12 hours      vitamin C (ASCORBIC ACID) 500 MG tablet Take 500 mg by mouth 2 times daily      zinc sulfate (ZINCATE) 220 (50 Zn) MG capsule Take 50 mg by mouth daily      acetaminophen (TYLENOL) 325 MG tablet Take 650 mg by mouth 3 times daily      amLODIPine (NORVASC) 5 MG tablet Take 5 mg by mouth daily       aspirin 81 MG tablet Take 81 mg by mouth daily      atorvastatin (LIPITOR) 10 MG tablet Take 10 mg by mouth daily      nebivolol (BYSTOLIC) 10 MG tablet Take 10 mg by mouth daily      Calcium 600-200 MG-UNIT TABS Take 1 tablet by mouth daily       Multiple Vitamins-Minerals (CENTRUM SILVER) TABS Take 1 tablet by mouth daily       donepezil (ARICEPT) 5 MG tablet Take 5 mg by mouth nightly      levothyroxine (SYNTHROID) 25 MCG tablet Take 25 mcg by mouth See Admin Instructions MONDAYS;  GIVE 12.5 MG BY MOUTH ALL OTHER DAYS      lisinopril (PRINIVIL;ZESTRIL) 40 MG tablet Take 20 mg by mouth daily       memantine ER (NAMENDA XR) 14 MG CP24 extended release capsule Take 14 mg by mouth daily      nitroGLYCERIN (NITROSTAT) 0.4 MG SL tablet Place 0.4 mg under the tongue every 5 minutes as needed for Chest pain up to max of 3 total doses. If no relief after 1 dose, call 911.  Cholecalciferol (VITAMIN D3) 1000 units TABS Take 1,000 Units by mouth daily       acetaminophen (TYLENOL) 325 MG tablet Take 650 mg by mouth daily as needed for Pain      Calcium 500 MG CHEW chewable tablet Take 1,000 mg by mouth 3 times daily as needed       No current facility-administered medications on file prior to visit. Allergies   Allergen Reactions    Amoxil [Amoxicillin]     Calcium Citrate     Noroxin [Norfloxacin]     Tiazac [Diltiazem]          Family History   Problem Relation Age of Onset    Cancer Brother          Physical Exam:      Physical Exam   Constitutional: No distress. HENT:   Head: Atraumatic. Eyes: EOM are normal. Left eye exhibits no discharge. Neck: Normal range of motion. Neck supple. No thyromegaly present. Cardiovascular: Normal rate and regular rhythm. Pulmonary/Chest: Breath sounds normal. She has no wheezes. Abdominal: She exhibits no distension. There is no abdominal tenderness. There is no rebound. Musculoskeletal: Normal range of motion. General: No edema. Neurological: She is alert. Skin: Skin is dry. She is not diaphoretic. Nursing note and vitals reviewed. There were no vitals taken for this visit.       .   Lab Results   Component Value Date    WBC 6.4 01/21/2021    HGB 12.9 01/21/2021    HCT 39.7 01/21/2021    MCV 94.3 01/21/2021     01/21/2021     Lab Results   Component Value Date     01/21/2021    K 4.7 01/21/2021    CL 99 01/21/2021    CO2 27 01/21/2021    BUN 18 01/21/2021    CREATININE 0.66 01/21/2021    GLUCOSE 117 01/21/2021    GLUCOSE 171 09/03/2020    CALCIUM 10.6 01/21/2021                ASSESSMENT:  Patient Active Problem List   Diagnosis    Hypothyroidism    Osteoarthritis    CAD (coronary artery disease)    Hyperlipidemia    Hypertension    Late onset Alzheimer's disease without behavioral disturbance (HCC)    Generalized anxiety disorder    Petechial rash    COVID-19    Hypoxia    Furuncle of labia majora    Diarrhea    Labial cyst    Confusion         PLAN:   Diagnosis Orders   1. Constipation, unspecified constipation type       Have reviewed bowel regimen at this time encourage fluid intake encourage fiber intake may consider enema if patient remains constipated continue mobilize as able will follow clinically.

## 2021-05-03 NOTE — PROGRESS NOTES
Patient Name: Laith Hernandez  YOB: 1931  Medical Record Number: [id number]    History of Present Illness: This 60-year-old woman was seen in her room today patient has been clinically stable no evidence of new acute psychosis though she is quite confused at her baseline. Nursing staff are concerned of possibly of a large bruise in her lower extremity patient does not require having an injury patient has been clinically stable. evidence of new change in her bowel or bladder habits has been intermittently incontinent which is typical for her. Patient has not needs acute upper respiratory infection and has been clinically at her baseline.     Review of Systems   Unable to perform ROS: Dementia       Review of Systems: All 14 review of systems negative other than as stated above    Social History     Tobacco Use    Smoking status: Never Smoker    Smokeless tobacco: Never Used   Substance Use Topics    Alcohol use: No    Drug use: No         Past Medical History:   Diagnosis Date    Alzheimer's dementia     Anxiety     CAD (coronary artery disease)     Dementia     Hyperlipidemia     Hypertension     Hypothyroidism     Melanoma ([address]     skin    Osteoarthritis            Past Surgical History:   Procedure Laterality Date    BACK SURGERY      BREAST SURGERY      biopsy    CORONARY ANGIOPLASTY WITH STENT PLACEMENT      HYSTERECTOMY  1960         Current Outpatient Medications on File Prior to Visit   Medication Sig Dispense Refill    enoxaparin (LOVENOX) 30 MG/0.3ML injection Inject into the skin 0.3mL every 12 hours      vitamin C (ASCORBIC ACID) 500 MG tablet Take 500 mg by mouth 2 times daily      zinc sulfate (ZINCATE) 220 (50 Zn) MG capsule Take 50 mg by mouth daily      acetaminophen (TYLENOL) 325 MG tablet Take 650 mg by mouth 3 times daily      amLODIPine (NORVASC) 5 MG tablet Take 5 mg by mouth daily       aspirin 81 MG tablet Take 81 mg by mouth daily      atorvastatin (LIPITOR) 10 MG tablet Take 10 mg by mouth daily      nebivolol (BYSTOLIC) 10 MG tablet Take 10 mg by mouth daily      Calcium 600-200 MG-UNIT TABS Take 1 tablet by mouth daily       Multiple Vitamins-Minerals (CENTRUM SILVER) TABS Take 1 tablet by mouth daily       donepezil (ARICEPT) 5 MG tablet Take 5 mg by mouth nightly      levothyroxine (SYNTHROID) 25 MCG tablet Take 25 mcg by mouth See Admin Instructions MONDAYS;  GIVE 12.5 MG BY MOUTH ALL OTHER DAYS      lisinopril (PRINIVIL;ZESTRIL) 40 MG tablet Take 20 mg by mouth daily       memantine ER (NAMENDA XR) 14 MG CP24 extended release capsule Take 14 mg by mouth daily      nitroGLYCERIN (NITROSTAT) 0.4 MG SL tablet Place 0.4 mg under the tongue every 5 minutes as needed for Chest pain up to max of 3 total doses. If no relief after 1 dose, call 911.  Cholecalciferol (VITAMIN D3) 1000 units TABS Take 1,000 Units by mouth daily       acetaminophen (TYLENOL) 325 MG tablet Take 650 mg by mouth daily as needed for Pain      Calcium 500 MG CHEW chewable tablet Take 1,000 mg by mouth 3 times daily as needed       No current facility-administered medications on file prior to visit. Allergies   Allergen Reactions    Amoxil [Amoxicillin]     Calcium Citrate     Noroxin [Norfloxacin]     Tiazac [Diltiazem]          Family History   Problem Relation Age of Onset    Cancer Brother          Physical Exam:      Physical Exam   Constitutional: She appears well-nourished. No distress. HENT:   Head: Normocephalic and atraumatic. Mouth/Throat: No oropharyngeal exudate. Eyes: EOM are normal. No scleral icterus. Neck: Normal range of motion. Neck supple. No JVD present. No tracheal deviation present. Cardiovascular: Normal rate and regular rhythm. Pulmonary/Chest: Breath sounds normal. She has no wheezes. Abdominal: Soft. Bowel sounds are normal. There is no abdominal tenderness. Musculoskeletal: Normal range of motion.          General: No tenderness or edema. Neurological: She is alert. Skin: Skin is dry. No rash noted. Nursing note and vitals reviewed. There were no vitals taken for this visit. .   Lab Results   Component Value Date    WBC 6.4 01/21/2021    HGB 12.9 01/21/2021    HCT 39.7 01/21/2021    MCV 94.3 01/21/2021     01/21/2021     Lab Results   Component Value Date     01/21/2021    K 4.7 01/21/2021    CL 99 01/21/2021    CO2 27 01/21/2021    BUN 18 01/21/2021    CREATININE 0.66 01/21/2021    GLUCOSE 117 01/21/2021    GLUCOSE 171 09/03/2020    CALCIUM 10.6 01/21/2021                ASSESSMENT:  Patient Active Problem List   Diagnosis    Hypothyroidism    Osteoarthritis    CAD (coronary artery disease)    Hyperlipidemia    Hypertension    Late onset Alzheimer's disease without behavioral disturbance (HCC)    Generalized anxiety disorder    Petechial rash    COVID-19    Hypoxia    Furuncle of labia majora    Diarrhea    Labial cyst    Confusion         PLAN:   Diagnosis Orders   1. Dementia without behavioral disturbance, unspecified dementia type ([address]     2. Osteoarthritis of multiple joints, unspecified osteoarthritis type     3. Essential hypertension     4. Hypothyroidism, unspecified type       Remains on the dementia unit rise benefit from close provision and redirection encourage nonpharmacological intervention from nursing staff. Continue anti-inflammatory as needed. Have reviewed patient's systolic pressures consider reduction in her blood pressure regimen. Continue to monitor TSH remains on Synthroid follow clinically.

## 2021-06-18 RX ORDER — LORAZEPAM 0.5 MG/1
0.5 TABLET ORAL DAILY PRN
COMMUNITY
End: 2022-03-25 | Stop reason: SDUPTHER

## 2021-07-08 ENCOUNTER — OFFICE VISIT (OUTPATIENT)
Dept: GERIATRIC MEDICINE | Age: 86
End: 2021-07-08
Payer: MEDICARE

## 2021-07-08 DIAGNOSIS — F03.90 DEMENTIA WITHOUT BEHAVIORAL DISTURBANCE, UNSPECIFIED DEMENTIA TYPE: Primary | ICD-10-CM

## 2021-07-08 DIAGNOSIS — M15.9 OSTEOARTHRITIS OF MULTIPLE JOINTS, UNSPECIFIED OSTEOARTHRITIS TYPE: ICD-10-CM

## 2021-07-08 DIAGNOSIS — I10 ESSENTIAL HYPERTENSION: ICD-10-CM

## 2021-07-23 NOTE — PROGRESS NOTES
[facility]    Seen for followup visit for her dementia, hypertension, degenerative joint disease, constipation. SUBJECTIVE:  This 70-year-old woman was seen in her room. The patient is at her baseline, globally weak. No recent emesis, fevers, chills. No change in her bowel or bladder habits. The patient has been confused at her baseline. No evidence of new acute psychosis, has been on the dementia unit, deriving benefit from close supervision and specialized care. The patient has been on very low dose calcium channel blocker and ACE inhibitor, tolerating it well, had been on anxiolytic and memantine. The patient is pain free. REVIEW OF SYSTEMS:  The patient denied chest pain, palpitation, nausea, vomiting. OBJECTIVE:  She appeared chronically ill. Pupils are small, but they are reactive. Oral mucosa is dry. Chest showed no crackles, no wheezing. Cardiovascular exam showed a regular rate. Abdomen was soft, nontender. Extremities showed trace dorsal pedal pulse. ASSESSMENT AND PLAN:  1. Hypertension:  The patient is on calcium channel blocker, is on ACE inhibitor. Monitoring electrolytes, would like to wean as able on current regimen. 2.   Dementia:  May consider maximizing patient's dose of Aricept. We will follow clinically. 3.   Degenerative joint disease:  Continue with antiinflammatories. We will follow as needed.           Electronically Signed By: Santa Vaz M.D. on 07/12/2021 01:15:54  ______________________________  Santa Vaz M.D.  [id number]  D: 07/09/2021 19:10:48  T: 07/09/2021 21:09:46    cc: [facility]

## 2021-08-19 ENCOUNTER — OFFICE VISIT (OUTPATIENT)
Dept: GERIATRIC MEDICINE | Age: 86
End: 2021-08-19
Payer: MEDICARE

## 2021-08-19 DIAGNOSIS — I10 ESSENTIAL HYPERTENSION: ICD-10-CM

## 2021-08-19 DIAGNOSIS — E03.9 HYPOTHYROIDISM, UNSPECIFIED TYPE: ICD-10-CM

## 2021-08-19 DIAGNOSIS — F02.80 LATE ONSET ALZHEIMER'S DISEASE WITHOUT BEHAVIORAL DISTURBANCE (HCC): Primary | ICD-10-CM

## 2021-08-19 DIAGNOSIS — G30.1 LATE ONSET ALZHEIMER'S DISEASE WITHOUT BEHAVIORAL DISTURBANCE (HCC): Primary | ICD-10-CM

## 2021-08-22 LAB
BACTERIA: ABNORMAL /HPF
BILIRUBIN URINE: NEGATIVE
BLOOD, URINE: NEGATIVE
CLARITY: ABNORMAL
COLOR: ABNORMAL
EPITHELIAL CELLS, UA: ABNORMAL /HPF (ref 0–5)
GLUCOSE URINE: NEGATIVE MG/DL
HYALINE CASTS: ABNORMAL /HPF (ref 0–5)
KETONES, URINE: NEGATIVE MG/DL
LEUKOCYTE ESTERASE, URINE: ABNORMAL
NITRITE, URINE: POSITIVE
PH UA: 5.5 (ref 5–9)
PROTEIN UA: NEGATIVE MG/DL
RBC UA: ABNORMAL /HPF (ref 0–5)
SPECIFIC GRAVITY UA: 1.02 (ref 1–1.03)
UROBILINOGEN, URINE: 0.2 E.U./DL
WBC UA: ABNORMAL /HPF (ref 0–5)

## 2021-08-24 LAB
ORGANISM: ABNORMAL
URINE CULTURE, ROUTINE: ABNORMAL

## 2021-09-19 NOTE — PROGRESS NOTES
SUBJECTIVE:  This 70-year-old woman seen for follow-up visit for dementia hypothyroidism hypertension. Patient clinically stable times no acute psychosis remains on the locked unit denies benefit from close reorientation no evidence of new falls but is globally unsteady. Notes no acute pain crisis. ROS: Limited by dementia  The rest of the 14 point ROS negative    PHYSICAL EXAM: VSS per facility record    Normocephalic pupils are small minimally reactive oral mucosa moist no thrush tongue is midline chest showed no deep inspiration on command cardiovascular showed a regular rate abdomen soft nontender extremity trace terrestrial pulse  ASSESSMENT & PLAN:   Diagnosis Orders   1. Late onset Alzheimer's disease without behavioral disturbance ([facility] [address]     2. Hypothyroidism, unspecified type     3. Essential hypertension       Continue close supervision and is on donepezil may consider maximizing dose is on Synthroid repeat TSH is at target is on memantine clinically stable. Patient is on ACE inhibitor is on beta-blocker notes no bradycardia monitor renal function closely no orthostasis            Past Medical History:   Diagnosis Date    Alzheimer's dementia     Anxiety     CAD (coronary artery disease)     Dementia     Hyperlipidemia     Hypertension     Hypothyroidism     Melanoma ([facility] [address]     skin    Osteoarthritis          Past Surgical History:   Procedure Laterality Date    BACK SURGERY      BREAST SURGERY      biopsy    CORONARY ANGIOPLASTY WITH STENT PLACEMENT      HYSTERECTOMY  1960         Current Outpatient Medications on File Prior to Visit   Medication Sig Dispense Refill    LORazepam (ATIVAN) 0.5 MG tablet Take 0.5 mg by mouth daily as needed for Anxiety.       enoxaparin (LOVENOX) 30 MG/0.3ML injection Inject into the skin 0.3mL every 12 hours      vitamin C (ASCORBIC ACID) 500 MG tablet Take 500 mg by mouth 2 times daily      zinc sulfate (ZINCATE) 220 (50 Zn) MG capsule Take 50 mg by mouth daily      acetaminophen (TYLENOL) 325 MG tablet Take 650 mg by mouth 3 times daily      amLODIPine (NORVASC) 5 MG tablet Take 5 mg by mouth daily       aspirin 81 MG tablet Take 81 mg by mouth daily      atorvastatin (LIPITOR) 10 MG tablet Take 10 mg by mouth daily      nebivolol (BYSTOLIC) 10 MG tablet Take 10 mg by mouth daily      Calcium 600-200 MG-UNIT TABS Take 1 tablet by mouth daily       Multiple Vitamins-Minerals (CENTRUM SILVER) TABS Take 1 tablet by mouth daily       donepezil (ARICEPT) 5 MG tablet Take 5 mg by mouth nightly      levothyroxine (SYNTHROID) 25 MCG tablet Take 25 mcg by mouth See Admin Instructions MONDAYS;  GIVE 12.5 MG BY MOUTH ALL OTHER DAYS      lisinopril (PRINIVIL;ZESTRIL) 40 MG tablet Take 20 mg by mouth daily       memantine ER (NAMENDA XR) 14 MG CP24 extended release capsule Take 14 mg by mouth daily      nitroGLYCERIN (NITROSTAT) 0.4 MG SL tablet Place 0.4 mg under the tongue every 5 minutes as needed for Chest pain up to max of 3 total doses. If no relief after 1 dose, call 911.  Cholecalciferol (VITAMIN D3) 1000 units TABS Take 1,000 Units by mouth daily       acetaminophen (TYLENOL) 325 MG tablet Take 650 mg by mouth daily as needed for Pain      Calcium 500 MG CHEW chewable tablet Take 1,000 mg by mouth 3 times daily as needed       No current facility-administered medications on file prior to visit.          Family History   Problem Relation Age of Onset    Cancer Brother        Social History     Socioeconomic History    Marital status:      Spouse name: Not on file    Number of children: Not on file    Years of education: Not on file    Highest education level: Not on file   Occupational History    Not on file   Tobacco Use    Smoking status: Never Smoker    Smokeless tobacco: Never Used   Substance and Sexual Activity    Alcohol use: No    Drug use: No    Sexual activity: Not on file   Other Topics Concern    Not on file   Social History Narrative    Not on file     Social Determinants of Health     Financial Resource Strain:     Difficulty of Paying Living Expenses:    Food Insecurity:     Worried About Running Out of Food in the Last Year:     [address] in the Last Year:    Transportation Needs:     Lack of Transportation (Medical):      Lack of Transportation (Non-Medical):    Physical Activity:     Days of Exercise per Week:     Minutes of Exercise per Session:    Stress:     Feeling of Stress :    Social Connections:     Frequency of Communication with Friends and Family:     Frequency of Social Gatherings with Friends and Family:     Attends Orthodoxy Services:     Active Member of Clubs or Organizations:     Attends Club or Organization Meetings:     Marital Status:    Intimate Partner Violence:     Fear of Current or Ex-Partner:     Emotionally Abused:     Physically Abused:     Sexually Abused:          No results found for: LABA1C  No results found for: EAG    Lab Results   Component Value Date     01/21/2021    K 4.7 01/21/2021    CL 99 01/21/2021    CO2 27 01/21/2021    BUN 18 01/21/2021    CREATININE 0.66 01/21/2021    GLUCOSE 117 01/21/2021    GLUCOSE 171 09/03/2020    CALCIUM 10.6 01/21/2021        Lab Results   Component Value Date    CHOL 154 01/21/2021    CHOL 147 08/08/2017     Lab Results   Component Value Date    TRIG 234 (H) 01/21/2021    TRIG 99 08/08/2017     Lab Results   Component Value Date    HDL 40 01/21/2021    HDL 67 (H) 08/08/2017     Lab Results   Component Value Date    LDLCALC 67 01/21/2021    [address] 08/08/2017     No results found for: LABVLDL, VLDL  No results found for: [facility]    Lab Results   Component Value Date    TSH 0.733 01/21/2021       Lab Results   Component Value Date    WBC 6.4 01/21/2021    HGB 12.9 01/21/2021    HCT 39.7 01/21/2021    MCV 94.3 01/21/2021     01/21/2021       Please note orders entered on site at facility after discussion with appropriate facility nursing/therapy/ / nutritional staff. Current longstanding medical problems and acute medical issues addressed with staff. Available data and data elements in on site paper chart reviewed and analyzed. Current external consultant notes reviewed in on site chart. Ordered laboratory testing and imaging will be reviewed when available.

## 2021-10-07 ENCOUNTER — OFFICE VISIT (OUTPATIENT)
Dept: GERIATRIC MEDICINE | Age: 86
End: 2021-10-07
Payer: MEDICARE

## 2021-10-07 DIAGNOSIS — F02.80 LATE ONSET ALZHEIMER'S DISEASE WITHOUT BEHAVIORAL DISTURBANCE (HCC): Primary | ICD-10-CM

## 2021-10-07 DIAGNOSIS — G30.1 LATE ONSET ALZHEIMER'S DISEASE WITHOUT BEHAVIORAL DISTURBANCE (HCC): Primary | ICD-10-CM

## 2021-10-07 DIAGNOSIS — I10 PRIMARY HYPERTENSION: ICD-10-CM

## 2021-10-14 LAB
BACTERIA: ABNORMAL /HPF
BILIRUBIN URINE: NEGATIVE
BLOOD, URINE: NEGATIVE
CLARITY: ABNORMAL
COLOR: YELLOW
EPITHELIAL CELLS, UA: ABNORMAL /HPF (ref 0–5)
GLUCOSE URINE: NEGATIVE MG/DL
HYALINE CASTS: ABNORMAL /HPF (ref 0–5)
KETONES, URINE: NEGATIVE MG/DL
LEUKOCYTE ESTERASE, URINE: ABNORMAL
NITRITE, URINE: POSITIVE
PH UA: 7 (ref 5–9)
PROTEIN UA: NEGATIVE MG/DL
RBC UA: ABNORMAL /HPF (ref 0–5)
SPECIFIC GRAVITY UA: 1.01 (ref 1–1.03)
UROBILINOGEN, URINE: 0.2 E.U./DL
WBC UA: ABNORMAL /HPF (ref 0–5)

## 2021-10-16 LAB
ORGANISM: ABNORMAL
URINE CULTURE, ROUTINE: ABNORMAL

## 2021-11-07 NOTE — PROGRESS NOTES
SUBJECTIVE:  This 63-year-old woman was seen for follow-up visit for her dementia hypertension generative disease. Patient been clinically stable remains on the dementia unit denies benefit from close vision reorientation. Patient has not recent emesis fevers or chills no change in her bowel bladder habits no new falls. ROS: Limited by dementia  The rest of the 14 point ROS negative    PHYSICAL EXAM: VSS per facility record  Chronically ill-appearing pupils are small but reactive oral mucosa moist no thrush neck was supple chest showed no crackles no wheezing cardiovascular showed a regular rate abdomen soft nontender extensional +1 terrestrial pulse    ASSESSMENT & PLAN:   Diagnosis Orders   1. Late onset Alzheimer's disease without behavioral disturbance ([facility] [address]     2. Primary hypertension         Patient is on donepezil may consider maximizing dose notes no acute psychosis. Patient is able. Is on memantine doing well. Patient is on appropriate antihypertensive regimen noticed acute orthostasis repeat BMP is pending          Past Medical History:   Diagnosis Date    Alzheimer's dementia     Anxiety     CAD (coronary artery disease)     Dementia     Hyperlipidemia     Hypertension     Hypothyroidism     Melanoma ([facility] [address]     skin    Osteoarthritis          Past Surgical History:   Procedure Laterality Date    BACK SURGERY      BREAST SURGERY      biopsy    CORONARY ANGIOPLASTY WITH STENT PLACEMENT      HYSTERECTOMY  1960         Current Outpatient Medications on File Prior to Visit   Medication Sig Dispense Refill    LORazepam (ATIVAN) 0.5 MG tablet Take 0.5 mg by mouth daily as needed for Anxiety.       enoxaparin (LOVENOX) 30 MG/0.3ML injection Inject into the skin 0.3mL every 12 hours      vitamin C (ASCORBIC ACID) 500 MG tablet Take 500 mg by mouth 2 times daily      zinc sulfate (ZINCATE) 220 (50 Zn) MG capsule Take 50 mg by mouth daily      acetaminophen (TYLENOL) 325 MG tablet Take 650 mg by mouth 3 times daily      amLODIPine (NORVASC) 5 MG tablet Take 5 mg by mouth daily       aspirin 81 MG tablet Take 81 mg by mouth daily      atorvastatin (LIPITOR) 10 MG tablet Take 10 mg by mouth daily      nebivolol (BYSTOLIC) 10 MG tablet Take 10 mg by mouth daily      Calcium 600-200 MG-UNIT TABS Take 1 tablet by mouth daily       Multiple Vitamins-Minerals (CENTRUM SILVER) TABS Take 1 tablet by mouth daily       donepezil (ARICEPT) 5 MG tablet Take 5 mg by mouth nightly      levothyroxine (SYNTHROID) 25 MCG tablet Take 25 mcg by mouth See Admin Instructions MONDAYS;  GIVE 12.5 MG BY MOUTH ALL OTHER DAYS      lisinopril (PRINIVIL;ZESTRIL) 40 MG tablet Take 20 mg by mouth daily       memantine ER (NAMENDA XR) 14 MG CP24 extended release capsule Take 14 mg by mouth daily      nitroGLYCERIN (NITROSTAT) 0.4 MG SL tablet Place 0.4 mg under the tongue every 5 minutes as needed for Chest pain up to max of 3 total doses. If no relief after 1 dose, call 911.  Cholecalciferol (VITAMIN D3) 1000 units TABS Take 1,000 Units by mouth daily       acetaminophen (TYLENOL) 325 MG tablet Take 650 mg by mouth daily as needed for Pain      Calcium 500 MG CHEW chewable tablet Take 1,000 mg by mouth 3 times daily as needed       No current facility-administered medications on file prior to visit.          Family History   Problem Relation Age of Onset    Cancer Brother        Social History     Socioeconomic History    Marital status:      Spouse name: Not on file    Number of children: Not on file    Years of education: Not on file    Highest education level: Not on file   Occupational History    Not on file   Tobacco Use    Smoking status: Never Smoker    Smokeless tobacco: Never Used   Substance and Sexual Activity    Alcohol use: No    Drug use: No    Sexual activity: Not on file   Other Topics Concern    Not on file   Social History Narrative    Not on file     Social Determinants of Health     Financial Resource Strain:     Difficulty of Paying Living Expenses: Not on file   Food Insecurity:     Worried About Running Out of Food in the Last Year: Not on file    Fariha of Food in the Last Year: Not on file   Transportation Needs:     Lack of Transportation (Medical): Not on file    Lack of Transportation (Non-Medical):  Not on file   Physical Activity:     Days of Exercise per Week: Not on file    Minutes of Exercise per Session: Not on file   Stress:     Feeling of Stress : Not on file   Social Connections:     Frequency of Communication with Friends and Family: Not on file    Frequency of Social Gatherings with Friends and Family: Not on file    Attends [employer] Services: Not on file    Active Member of [address] [place] [address] [employer] or Organizations: Not on file    Attends Club or Organization Meetings: Not on file    Marital Status: Not on file   Intimate Partner Violence:     Fear of Current or Ex-Partner: Not on file    Emotionally Abused: Not on file    Physically Abused: Not on file    Sexually Abused: Not on file   Housing Stability:     Unable to Pay for Housing in the Last Year: Not on file    Number of [place] in the Last Year: Not on file    Unstable Housing in the Last Year: Not on file         No results found for: LABA1C  No results found for: EAG    Lab Results   Component Value Date     01/21/2021    K 4.7 01/21/2021    CL 99 01/21/2021    CO2 27 01/21/2021    BUN 18 01/21/2021    CREATININE 0.66 01/21/2021    GLUCOSE 117 01/21/2021    GLUCOSE 171 09/03/2020    CALCIUM 10.6 01/21/2021        Lab Results   Component Value Date    CHOL 154 01/21/2021    CHOL 147 08/08/2017     Lab Results   Component Value Date    TRIG 234 (H) 01/21/2021    TRIG 99 08/08/2017     Lab Results   Component Value Date    HDL 40 01/21/2021    HDL 67 (H) 08/08/2017     Lab Results   Component Value Date    LDLCALC 67 01/21/2021    [address] 60 08/08/2017     No results found for: LABVLDL, VLDL  No results found for: [facility]    Lab Results   Component Value Date    TSH 0.733 01/21/2021       Lab Results   Component Value Date    WBC 6.4 01/21/2021    HGB 12.9 01/21/2021    HCT 39.7 01/21/2021    MCV 94.3 01/21/2021     01/21/2021       Please note orders entered on site at facility after discussion with appropriate facility nursing/therapy/ / nutritional staff. Current longstanding medical problems and acute medical issues addressed with staff. Available data and data elements in on site paper chart reviewed and analyzed. Current external consultant notes reviewed in on site chart. Ordered laboratory testing and imaging will be reviewed when available.

## 2021-11-23 LAB
BACTERIA: ABNORMAL /HPF
BILIRUBIN URINE: NEGATIVE
BLOOD, URINE: NEGATIVE
CLARITY: ABNORMAL
COLOR: YELLOW
CRYSTALS, UA: ABNORMAL /HPF
EPITHELIAL CELLS, UA: ABNORMAL /HPF (ref 0–5)
GLUCOSE URINE: NEGATIVE MG/DL
KETONES, URINE: ABNORMAL MG/DL
LEUKOCYTE ESTERASE, URINE: ABNORMAL
NITRITE, URINE: NEGATIVE
PH UA: 5 (ref 5–9)
PROTEIN UA: ABNORMAL MG/DL
RBC UA: ABNORMAL /HPF (ref 0–2)
SPECIFIC GRAVITY UA: 1.02 (ref 1–1.03)
UROBILINOGEN, URINE: 0.2 E.U./DL
WBC UA: >100 /HPF (ref 0–5)

## 2021-11-26 LAB
ORGANISM: ABNORMAL
URINE CULTURE, ROUTINE: ABNORMAL

## 2021-12-17 ENCOUNTER — OFFICE VISIT (OUTPATIENT)
Dept: GERIATRIC MEDICINE | Age: 86
End: 2021-12-17
Payer: MEDICARE

## 2021-12-17 DIAGNOSIS — R41.82 ALTERED MENTAL STATUS, UNSPECIFIED ALTERED MENTAL STATUS TYPE: Primary | ICD-10-CM

## 2021-12-17 LAB
ANION GAP SERPL CALCULATED.3IONS-SCNC: 12 MEQ/L (ref 9–15)
BUN BLDV-MCNC: 22 MG/DL (ref 8–23)
CALCIUM SERPL-MCNC: 9.8 MG/DL (ref 8.5–9.9)
CHLORIDE BLD-SCNC: 99 MEQ/L (ref 95–107)
CO2: 24 MEQ/L (ref 20–31)
CREAT SERPL-MCNC: 0.74 MG/DL (ref 0.5–0.9)
GFR AFRICAN AMERICAN: >60
GFR NON-AFRICAN AMERICAN: >60
GLUCOSE BLD-MCNC: 91 MG/DL (ref 70–99)
HCT VFR BLD CALC: 35.7 % (ref 37–47)
HEMOGLOBIN: 11.6 G/DL (ref 12–16)
MCH RBC QN AUTO: 30.9 PG (ref 27–31.3)
MCHC RBC AUTO-ENTMCNC: 32.4 % (ref 33–37)
MCV RBC AUTO: 95.4 FL (ref 82–100)
PDW BLD-RTO: 13.6 % (ref 11.5–14.5)
PLATELET # BLD: 192 K/UL (ref 130–400)
POTASSIUM SERPL-SCNC: 4.5 MEQ/L (ref 3.4–4.9)
RBC # BLD: 3.74 M/UL (ref 4.2–5.4)
REASON FOR REJECTION: NORMAL
REASON FOR REJECTION: NORMAL
REJECTED TEST: NORMAL
REJECTED TEST: NORMAL
SODIUM BLD-SCNC: 135 MEQ/L (ref 135–144)
WBC # BLD: 5.9 K/UL (ref 4.8–10.8)

## 2021-12-19 LAB
BACTERIA: NEGATIVE /HPF
BILIRUBIN URINE: NEGATIVE
BLOOD, URINE: NEGATIVE
CLARITY: CLEAR
COLOR: YELLOW
EPITHELIAL CELLS, UA: ABNORMAL /HPF (ref 0–5)
GLUCOSE URINE: NEGATIVE MG/DL
HYALINE CASTS: ABNORMAL /HPF (ref 0–5)
KETONES, URINE: NEGATIVE MG/DL
LEUKOCYTE ESTERASE, URINE: ABNORMAL
NITRITE, URINE: NEGATIVE
PH UA: 5 (ref 5–9)
PROTEIN UA: NEGATIVE MG/DL
RBC UA: ABNORMAL /HPF (ref 0–5)
SPECIFIC GRAVITY UA: 1.01 (ref 1–1.03)
UROBILINOGEN, URINE: 0.2 E.U./DL
WBC UA: ABNORMAL /HPF (ref 0–5)

## 2021-12-21 LAB — URINE CULTURE, ROUTINE: NORMAL

## 2022-01-16 VITALS
OXYGEN SATURATION: 96 % | HEART RATE: 58 BPM | DIASTOLIC BLOOD PRESSURE: 56 MMHG | TEMPERATURE: 97.9 F | SYSTOLIC BLOOD PRESSURE: 98 MMHG

## 2022-01-16 PROBLEM — R41.82 ALTERED MENTAL STATUS: Status: ACTIVE | Noted: 2022-01-16

## 2022-01-16 NOTE — PROGRESS NOTES
[id number] [address] LIVING  Chief Complaint   Patient presents with    Altered Mental Status       REASON FOR VISIT:  The patient is being seen today for acute visit for altered mental status. SUBJECTIVE:  Nursing staff reports that the patient is somewhat lethargic and she does not want to eat or drink and they are concerned that she may have UTI. Otherwise, no concerns. FAMILY AND SOCIAL HISTORY:  Refer to H&P. Past Medical History:   Diagnosis Date    Alzheimer's dementia     Anxiety     CAD (coronary artery disease)     Dementia     Hyperlipidemia     Hypertension     Hypothyroidism     Melanoma ([address]     skin    Osteoarthritis        MEDICATIONS AND ALLERGIES:  Reviewed on chart at nursing facility. Current Outpatient Medications on File Prior to Visit   Medication Sig Dispense Refill    LORazepam (ATIVAN) 0.5 MG tablet Take 0.5 mg by mouth daily as needed for Anxiety.       enoxaparin (LOVENOX) 30 MG/0.3ML injection Inject into the skin 0.3mL every 12 hours      vitamin C (ASCORBIC ACID) 500 MG tablet Take 500 mg by mouth 2 times daily      zinc sulfate (ZINCATE) 220 (50 Zn) MG capsule Take 50 mg by mouth daily      acetaminophen (TYLENOL) 325 MG tablet Take 650 mg by mouth 3 times daily      amLODIPine (NORVASC) 5 MG tablet Take 5 mg by mouth daily       aspirin 81 MG tablet Take 81 mg by mouth daily      atorvastatin (LIPITOR) 10 MG tablet Take 10 mg by mouth daily      nebivolol (BYSTOLIC) 10 MG tablet Take 10 mg by mouth daily      Calcium 600-200 MG-UNIT TABS Take 1 tablet by mouth daily       Multiple Vitamins-Minerals (CENTRUM SILVER) TABS Take 1 tablet by mouth daily       donepezil (ARICEPT) 5 MG tablet Take 5 mg by mouth nightly      levothyroxine (SYNTHROID) 25 MCG tablet Take 25 mcg by mouth See Admin Instructions MONDAYS;  GIVE 12.5 MG BY MOUTH ALL OTHER DAYS      lisinopril (PRINIVIL;ZESTRIL) 40 MG tablet Take 20 mg by mouth daily       memantine ER (NAMENDA XR) 14 MG CP24 extended release capsule Take 14 mg by mouth daily      nitroGLYCERIN (NITROSTAT) 0.4 MG SL tablet Place 0.4 mg under the tongue every 5 minutes as needed for Chest pain up to max of 3 total doses. If no relief after 1 dose, call 911.  Cholecalciferol (VITAMIN D3) 1000 units TABS Take 1,000 Units by mouth daily       acetaminophen (TYLENOL) 325 MG tablet Take 650 mg by mouth daily as needed for Pain      Calcium 500 MG CHEW chewable tablet Take 1,000 mg by mouth 3 times daily as needed       No current facility-administered medications on file prior to visit. REVIEW OF SYSTEMS:  Cannot be obtained due to resident's cognition level. PHYSICAL EXAMINATION:  Most recent vital signs:  BP 98/56, heart rate 58, temperature 97.9, pulse oxing 96% on room air. CONSTITUTIONAL:  Resident is resting in bed. She does respond when spoken to; however, she is confused. INTEGUMENT:  Pale, pink, warm, dry. HEENT:  Normocephalic, atraumatic. Hard of hearing. Conjunctivae pink. Sclerae nonicteric. Mucous membranes pink, moist.  No oropharyngeal exudates. NECK:  Supple. No cervical or clavicular lymphadenopathy. No masses noted. CARDIOVASCULAR:  Heart rate is regular rate and rhythm. No murmurs, gallops, or rubs noted. RESPIRATORY:  Lung sounds are clear throughout all fields. Respirations even, nonlabored. No use of accessory muscles with breathing. She is pulse oxing 96% on room air. ABDOMEN:  Soft, nontender, nondistended. Normoactive bowel sounds. PV:  Peripheral pulses present. No edema noted. MUSCULOSKELETAL:  No muscle tenderness. No joint discomfort. ASSESSMENT AND PLAN:  1. Altered mental status. I will order UA C&S, CMP, CBC with diff STAT. I will empirically start the patient in Cipro 500 mg p.o. q.12 x7 days.   I will have nursing staff straight cath the patient if needed to obtain UA C&S and I will order a 1 time Ativan 0.25 mg p.o. x1 prior to straight cath if needed. I have reviewed this resident's medication and treatment plan, as well as, most recent lab work. No further changes will be made at this time. Return in about 3 months (around 3/17/2022), or if symptoms worsen or fail to improve, for chronic conditions. Please note this report is partially produced by using speech recognition hardware. It may contain errors related to the system, including grammar, punctuation and spelling as well as words and phrases that may seem inaccurate. For any questions or concerns feel free to contact me for clarification           Electronically Signed By: Ramon Pedro. Dennie Caprio, CNP on 01/16/2022 14:06:19  ______________________________  Ramon Pedro. Dennie Caprio, CNP  [id number]  D: 01/14/2022 23:59:41  T: 01/15/2022 00:23:16    cc: - [facility]

## 2022-02-11 LAB
BACTERIA: ABNORMAL /HPF
BILIRUBIN URINE: NEGATIVE
BLOOD, URINE: NEGATIVE
CLARITY: CLEAR
COLOR: YELLOW
EPITHELIAL CELLS, UA: ABNORMAL /HPF (ref 0–5)
GLUCOSE URINE: NEGATIVE MG/DL
HYALINE CASTS: ABNORMAL /HPF (ref 0–5)
KETONES, URINE: NEGATIVE MG/DL
LEUKOCYTE ESTERASE, URINE: ABNORMAL
NITRITE, URINE: NEGATIVE
PH UA: 7 (ref 5–9)
PROTEIN UA: NEGATIVE MG/DL
RBC UA: ABNORMAL /HPF (ref 0–5)
SPECIFIC GRAVITY UA: 1.01 (ref 1–1.03)
UROBILINOGEN, URINE: 0.2 E.U./DL
WBC UA: ABNORMAL /HPF (ref 0–5)

## 2022-02-13 LAB
ORGANISM: ABNORMAL
URINE CULTURE, ROUTINE: ABNORMAL

## 2022-03-25 DIAGNOSIS — F41.9 ANXIETY: Primary | ICD-10-CM

## 2022-03-27 RX ORDER — LORAZEPAM 0.5 MG/1
0.5 TABLET ORAL DAILY PRN
Qty: 14 TABLET | Refills: 0 | Status: SHIPPED | OUTPATIENT
Start: 2022-03-27 | End: 2022-04-10

## 2022-10-10 DIAGNOSIS — I10 HYPERTENSION, UNSPECIFIED TYPE: Primary | ICD-10-CM

## 2022-10-11 RX ORDER — NEBIVOLOL 10 MG/1
10 TABLET ORAL DAILY
Qty: 30 TABLET | Refills: 3 | Status: SHIPPED | OUTPATIENT
Start: 2022-10-11

## 2022-10-19 ENCOUNTER — OFFICE VISIT (OUTPATIENT)
Dept: GERIATRIC MEDICINE | Age: 87
End: 2022-10-19
Payer: MEDICARE
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
## 2022-10-19 PROCEDURE — 1123F ACP DISCUSS/DSCN MKR DOCD: CPT | Performed by: NURSE PRACTITIONER

## 2022-10-26 ENCOUNTER — OFFICE VISIT (OUTPATIENT)
Dept: GERIATRIC MEDICINE | Age: 87
End: 2022-10-26
Payer: MEDICARE

## 2022-10-26 DIAGNOSIS — N39.0 URINARY TRACT INFECTION WITHOUT HEMATURIA, SITE UNSPECIFIED: Primary | ICD-10-CM
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
## 2022-10-26 PROCEDURE — 1123F ACP DISCUSS/DSCN MKR DOCD: CPT | Performed by: NURSE PRACTITIONER

## 2022-10-31 DIAGNOSIS — I10 HYPERTENSION, UNSPECIFIED TYPE: Primary | ICD-10-CM

## 2022-11-01 RX ORDER — AMLODIPINE BESYLATE 5 MG/1
5 TABLET ORAL DAILY
Qty: 30 TABLET | Refills: 3 | Status: SHIPPED | OUTPATIENT
Start: 2022-11-01

## 2022-11-01 RX ORDER — LISINOPRIL 40 MG/1
20 TABLET ORAL DAILY
Qty: 30 TABLET | Refills: 3 | Status: SHIPPED | OUTPATIENT
Start: 2022-11-01

## 2022-11-10 ENCOUNTER — OFFICE VISIT (OUTPATIENT)
Dept: GERIATRIC MEDICINE | Age: 87
End: 2022-11-10
Payer: MEDICARE

## 2022-11-10 DIAGNOSIS — G30.1 LATE ONSET ALZHEIMER'S DISEASE WITHOUT BEHAVIORAL DISTURBANCE (HCC): Primary | ICD-10-CM

## 2022-11-10 DIAGNOSIS — F02.80 LATE ONSET ALZHEIMER'S DISEASE WITHOUT BEHAVIORAL DISTURBANCE (HCC): Primary | ICD-10-CM

## 2022-11-10 PROCEDURE — 1123F ACP DISCUSS/DSCN MKR DOCD: CPT | Performed by: INTERNAL MEDICINE

## 2022-11-13 NOTE — PROGRESS NOTES
[facility]  10/19/2022    Nadeen Sepulveda  is a 80 y.o. in the [place] being seen for a acute visit for   Chief Complaint   Patient presents with    Fall     Fall 10/16       HPI patient being seen today for acute visit for fall that occurred on 10/16/2022. Patient reports she does not recall the events surrounding her fall and she inquires if I am certain that she experienced a fall. She states she feels fine and offers no concerns. They are eating and drinking at their baseline per nursing. They have had no recent falls with injuries. They are not complaining of any un addressed pain issues. Have had no recent choking or dysphagia issues. NO agitation or unusual mental behavioral issues.      Past Medical History:   Diagnosis Date    Alzheimer's dementia     Anxiety     CAD (coronary artery disease)     Dementia     Hyperlipidemia     Hypertension     Hypothyroidism     Melanoma ([facility] [address]     skin    Osteoarthritis      Past Surgical History:   Procedure Laterality Date    BACK SURGERY      BREAST SURGERY      biopsy    CORONARY ANGIOPLASTY WITH STENT PLACEMENT      HYSTERECTOMY  1960     Family History   Problem Relation Age of Onset    Cancer Brother      Social History     Socioeconomic History    Marital status:      Spouse name: Not on file    Number of children: Not on file    Years of education: Not on file    Highest education level: Not on file   Occupational History    Not on file   Tobacco Use    Smoking status: Never    Smokeless tobacco: Never   Substance and Sexual Activity    Alcohol use: No    Drug use: No    Sexual activity: Not on file   Other Topics Concern    Not on file   Social History Narrative    Not on file     Social Determinants of Health     Financial Resource Strain: Not on file   Food Insecurity: Not on file   Transportation Needs: Not on file   Physical Activity: Not on file   Stress: Not on file   Social Connections: Not on file   Intimate Partner Violence: Not on file   Housing Stability: Not on file       Allergies: Amoxil [amoxicillin], Calcium citrate, Noroxin [norfloxacin], and Tiazac [diltiazem]  NF MEDICATIONS REVIEWED    ROS:   Constitutional: There are no reports of behavioral issues, change in appetite, fever, or increased weakness. No bleeding issues. Respiratory: denies SOB, dyspnea, dyspnea on exertion  Cardiovascular: denies CP, lightheadedness, palpitations, PND, orthopnea, or claudication. GI: No N/V/D. : no reports of dysuria, frequency or urgency  Extremities: No reports of pain issues, edema  Psych: at baseline confusion     Physical exam:   Blood pressure 124/64, temperature 98.3, heart rate 78, respirations 16, pulse ox 96% room air. Constitutional: Awake and alert sitting up in recliner, confused at her baseline, general weakness  HEENT: Normocephalic, hard of hearing,conjunctiva pink, sclera non icteric,  buccal mucosa pink and moist  Speech clear. NECK: - no cervical or clavicular lymphadenopathy, euthyroid, no mass visualized  Cardiovascular: Regular rate, and rhythm. No murmurs, gallops or rubs noted. Respiratory: LCTA, even unlabored respirations, no accessory muscle use noted  GI: abdomen NT, +BS x 4 Q, ND  : No suprapubic tenderness  Extremities: no ankle edema, PPP  SKELETAL: no redness, or swelling over joints. Limited ROM but spontaneous movement x 4   Psych: pleasantly confused, repetitive , needs re-directed, good judgement, normal thought content  SKIN: no gross skin lesions noted on visualized skin, warm and dry    ASSESSMENT:     Diagnosis Orders   1. Fall, initial encounter            PLAN:  Pt/POA agrees with POC   No injuries noted as a result of her fall. Vital signs have been stable. Neurochecks have been within normal limits. Patient's cognition remains at her baseline. Return in about 3 months (around 1/19/2023), or if symptoms worsen or fail to improve, for chronic conditions.   Please note this report is partially produced by using speech recognition hardware. It may contain errors related to the system, including grammar, punctuation and spelling as well as words and phrases that may seem inaccurate.   For any questions or concerns feel free to contact me for clarification       THAO Haywood - CNP

## 2022-11-25 NOTE — PROGRESS NOTES
[facility]  10/26/2022    Miles Staton  is a 80 y.o. in the [place] being seen for a acute visit for   Chief Complaint   Patient presents with    Urinary Tract Infection    Fall     10/19/22       HPI Patient being seen today for UTI and fall that occurred on 10/19/22 with [facility]. They are eating and drinking at their baseline per nursing. They are not complaining of any un addressed pain issues. Have had no recent choking or dysphagia issues. NO agitation or unusual mental behavioral issues.      Past Medical History:   Diagnosis Date    Alzheimer's dementia     Anxiety     CAD (coronary artery disease)     Dementia     Hyperlipidemia     Hypertension     Hypothyroidism     Melanoma ([facility] [address]     skin    Osteoarthritis      Past Surgical History:   Procedure Laterality Date    BACK SURGERY      BREAST SURGERY      biopsy    CORONARY ANGIOPLASTY WITH STENT PLACEMENT      HYSTERECTOMY  1960     Family History   Problem Relation Age of Onset    Cancer Brother      Social History     Socioeconomic History    Marital status:      Spouse name: Not on file    Number of children: Not on file    Years of education: Not on file    Highest education level: Not on file   Occupational History    Not on file   Tobacco Use    Smoking status: Never    Smokeless tobacco: Never   Substance and Sexual Activity    Alcohol use: No    Drug use: No    Sexual activity: Not on file   Other Topics Concern    Not on file   Social History Narrative    Not on file     Social Determinants of Health     Financial Resource Strain: Not on file   Food Insecurity: Not on file   Transportation Needs: Not on file   Physical Activity: Not on file   Stress: Not on file   Social Connections: Not on file   Intimate Partner Violence: Not on file   Housing Stability: Not on file       Allergies: Amoxil [amoxicillin], Calcium citrate, Noroxin [norfloxacin], and Tiazac [diltiazem]  NF MEDICATIONS REVIEWED    ROS:   Constitutional: There are no reports of behavioral issues, change in appetite, fever, or increased weakness. No bleeding issues. Respiratory: denies SOB, dyspnea, dyspnea on exertion  Cardiovascular: denies CP, lightheadedness, palpitations, PND, orthopnea, or claudication. GI: No N/V/D. : no reports of dysuria, frequency or urgency  Extremities: No reports of pain issues, edema  Psych: at baseline confusion     Physical exam:   /66, temperature 97.7, heart rate 74, respirations 16, spo2 96%, weight 138lbs  Constitutional:Sleeping but arousable, sitting up in recliner, general weakness  HEENT: Normocephalic, [place],conjunctiva pink, sclera non icteric,  buccal mucosa pink and moist  Speech clear. NECK: - no cervical or clavicular lymphadenopathy, euthyroid, no mass visualized  Cardiovascular: Regular rate, and rhythm. No murmurs, gallops or rubs noted. Respiratory: LCTA, even unlabored respirations, no accessory muscle use noted  GI: abdomen NT, +BS x 4 Q, ND  : no suprapubic tenderness  Extremities: no ankle edema, PPP  SKELETAL: no redness, or swelling over joints. Limited ROM but spontaneous movement x 4   Psych: pleasantly confused  SKIN: no gross skin lesions noted on visualized skin, warm and dry. Extensive bruising to face and neck in various stages of healing. ASSESSMENT:     Diagnosis Orders   1. Urinary tract infection without hematuria, site unspecified        2. Fall, initial encounter            PLAN:  Pt/POA agrees with POC   Patient tolerating Cipro without incident. Evaluated at ER immediately after fall. Has been stable since her return. Return in about 3 months (around 1/26/2023), or if symptoms worsen or fail to improve, for chronic conditions. Please note this report is partially produced by using speech recognition hardware. It may contain errors related to the system, including grammar, punctuation and spelling as well as words and phrases that may seem inaccurate.   For any questions or concerns feel free to contact me for clarification       Chris Beasley, APRN - CNP

## 2022-12-10 NOTE — PROGRESS NOTES
SUBJECTIVE:  This 57-year-old woman seen follow-up visit in the dementia unit and since living for functional reevaluation patient has been coughing currently without chest palpitation no change in her bowel bladder habits no new falls patient is globally weak at her baseline without pain. ROS: Limited by cognition  The rest of the 14 point ROS negative    PHYSICAL EXAM: VSS per facility record  To self pupils are small without reactive oral mucosa moist chest with coarse breath sounds cardiovascular showed a regular rate abdomen soft nontender EXTR trace edema    ASSESSMENT & PLAN:   Diagnosis Orders   1. Late onset Alzheimer's disease without behavioral disturbance ([facility] [address]        2. Hypothyroidism, unspecified type        3. Primary hypertension            Is memantine is on donepezil functionally stable globally weak at this time. Patient on recent \"no lightheadedness remains on Synthroid.           Past Medical History:   Diagnosis Date    Alzheimer's dementia     Anxiety     CAD (coronary artery disease)     Dementia     Hyperlipidemia     Hypertension     Hypothyroidism     Melanoma ([facility] [address]     skin    Osteoarthritis          Past Surgical History:   Procedure Laterality Date    BACK SURGERY      BREAST SURGERY      biopsy    CORONARY ANGIOPLASTY WITH STENT PLACEMENT      HYSTERECTOMY  1960         Current Outpatient Medications on File Prior to Visit   Medication Sig Dispense Refill    lisinopril (PRINIVIL;ZESTRIL) 40 MG tablet Take 0.5 tablets by mouth daily 30 tablet 3    amLODIPine (NORVASC) 5 MG tablet Take 1 tablet by mouth daily 30 tablet 3    nebivolol (BYSTOLIC) 10 MG tablet Take 1 tablet by mouth daily 30 tablet 3    enoxaparin (LOVENOX) 30 MG/0.3ML injection Inject into the skin 0.3mL every 12 hours      vitamin C (ASCORBIC ACID) 500 MG tablet Take 500 mg by mouth 2 times daily      zinc sulfate (ZINCATE) 220 (50 Zn) MG capsule Take 50 mg by mouth daily      acetaminophen (TYLENOL) 325 MG tablet Take 650 mg by mouth 3 times daily      aspirin 81 MG tablet Take 81 mg by mouth daily      atorvastatin (LIPITOR) 10 MG tablet Take 10 mg by mouth daily      Calcium 600-200 MG-UNIT TABS Take 1 tablet by mouth daily       Multiple Vitamins-Minerals (CENTRUM SILVER) TABS Take 1 tablet by mouth daily       donepezil (ARICEPT) 5 MG tablet Take 5 mg by mouth nightly      levothyroxine (SYNTHROID) 25 MCG tablet Take 25 mcg by mouth See Admin Instructions MONDAYS;  GIVE 12.5 MG BY MOUTH ALL OTHER DAYS      memantine ER (NAMENDA XR) 14 MG CP24 extended release capsule Take 14 mg by mouth daily      nitroGLYCERIN (NITROSTAT) 0.4 MG SL tablet Place 0.4 mg under the tongue every 5 minutes as needed for Chest pain up to max of 3 total doses. If no relief after 1 dose, call 911. Cholecalciferol (VITAMIN D3) 1000 units TABS Take 1,000 Units by mouth daily       acetaminophen (TYLENOL) 325 MG tablet Take 650 mg by mouth daily as needed for Pain      Calcium 500 MG CHEW chewable tablet Take 1,000 mg by mouth 3 times daily as needed       No current facility-administered medications on file prior to visit.          Family History   Problem Relation Age of Onset    Cancer Brother        Social History     Socioeconomic History    Marital status:      Spouse name: Not on file    Number of children: Not on file    Years of education: Not on file    Highest education level: Not on file   Occupational History    Not on file   Tobacco Use    Smoking status: Never    Smokeless tobacco: Never   Substance and Sexual Activity    Alcohol use: No    Drug use: No    Sexual activity: Not on file   Other Topics Concern    Not on file   Social History Narrative    Not on file     Social Determinants of Health     Financial Resource Strain: Not on file   Food Insecurity: Not on file   Transportation Needs: Not on file   Physical Activity: Not on file   Stress: Not on file   Social Connections: Not on file   Intimate Partner Violence: Not on file   Housing Stability: Not on file         No results found for: LABA1C  No results found for: EAG    Lab Results   Component Value Date/Time     12/17/2021 04:30 PM    K 4.5 12/17/2021 04:30 PM    CL 99 12/17/2021 04:30 PM    CO2 24 12/17/2021 04:30 PM    BUN 22 12/17/2021 04:30 PM    CREATININE 0.74 12/17/2021 04:30 PM    GLUCOSE 91 12/17/2021 04:30 PM    GLUCOSE 171 09/03/2020 07:40 PM    CALCIUM 9.8 12/17/2021 04:30 PM        Lab Results   Component Value Date    CHOL 154 01/21/2021    CHOL 147 08/08/2017     Lab Results   Component Value Date    TRIG 234 (H) 01/21/2021    TRIG 99 08/08/2017     Lab Results   Component Value Date    HDL 40 01/21/2021    HDL 67 (H) 08/08/2017     Lab Results   Component Value Date    LDLCALC 67 01/21/2021    LDLCALC 60 08/08/2017     No results found for: LABVLDL, VLDL  No results found for: CHOLHDLRATIO    Lab Results   Component Value Date    TSH 0.733 01/21/2021       Lab Results   Component Value Date    WBC 5.9 12/17/2021    HGB 11.6 (L) 12/17/2021    HCT 35.7 (L) 12/17/2021    MCV 95.4 12/17/2021     12/17/2021       Please note orders entered on site at facility after discussion with appropriate facility nursing/therapy/ / nutritional staff. Current longstanding medical problems and acute medical issues addressed with staff. Available data and data elements in on site paper chart reviewed and analyzed. Current external consultant notes reviewed in on site chart. Ordered laboratory testing and imaging will be reviewed when available.

## 2022-12-30 LAB
ANION GAP SERPL CALCULATED.3IONS-SCNC: 13 MEQ/L (ref 9–15)
BASOPHILS ABSOLUTE: 0 K/UL (ref 0–0.2)
BASOPHILS RELATIVE PERCENT: 0.6 %
BUN BLDV-MCNC: 16 MG/DL (ref 8–23)
CALCIUM SERPL-MCNC: 9.3 MG/DL (ref 8.5–9.9)
CHLORIDE BLD-SCNC: 100 MEQ/L (ref 95–107)
CO2: 25 MEQ/L (ref 20–31)
CREAT SERPL-MCNC: 0.78 MG/DL (ref 0.5–0.9)
EOSINOPHILS ABSOLUTE: 0.2 K/UL (ref 0–0.7)
EOSINOPHILS RELATIVE PERCENT: 3 %
GFR SERPL CREATININE-BSD FRML MDRD: >60 ML/MIN/{1.73_M2}
GLUCOSE BLD-MCNC: 202 MG/DL (ref 70–99)
HCT VFR BLD CALC: 37.4 % (ref 37–47)
HEMOGLOBIN: 12 G/DL (ref 12–16)
LYMPHOCYTES ABSOLUTE: 2.2 K/UL (ref 1–4.8)
LYMPHOCYTES RELATIVE PERCENT: 35.5 %
MCH RBC QN AUTO: 30.1 PG (ref 27–31.3)
MCHC RBC AUTO-ENTMCNC: 32 % (ref 33–37)
MCV RBC AUTO: 94.2 FL (ref 79.4–94.8)
MONOCYTES ABSOLUTE: 0.4 K/UL (ref 0.2–0.8)
MONOCYTES RELATIVE PERCENT: 6.1 %
NEUTROPHILS ABSOLUTE: 3.4 K/UL (ref 1.4–6.5)
NEUTROPHILS RELATIVE PERCENT: 54.8 %
PDW BLD-RTO: 14 % (ref 11.5–14.5)
PLATELET # BLD: 232 K/UL (ref 130–400)
POTASSIUM SERPL-SCNC: 4 MEQ/L (ref 3.4–4.9)
RBC # BLD: 3.97 M/UL (ref 4.2–5.4)
SODIUM BLD-SCNC: 138 MEQ/L (ref 135–144)
WBC # BLD: 6.3 K/UL (ref 4.8–10.8)

## 2023-01-05 ENCOUNTER — NURSE ONLY (OUTPATIENT)
Dept: GERIATRIC MEDICINE | Age: 88
End: 2023-01-05
Payer: MEDICARE

## 2023-01-05 DIAGNOSIS — I10 PRIMARY HYPERTENSION: ICD-10-CM

## 2023-01-05 DIAGNOSIS — F02.80 LATE ONSET ALZHEIMER'S DISEASE WITHOUT BEHAVIORAL DISTURBANCE (HCC): Primary | ICD-10-CM

## 2023-01-05 DIAGNOSIS — G30.1 LATE ONSET ALZHEIMER'S DISEASE WITHOUT BEHAVIORAL DISTURBANCE (HCC): Primary | ICD-10-CM

## 2023-01-05 DIAGNOSIS — E03.9 HYPOTHYROIDISM, UNSPECIFIED TYPE: ICD-10-CM

## 2023-01-05 PROCEDURE — 1123F ACP DISCUSS/DSCN MKR DOCD: CPT | Performed by: NURSE PRACTITIONER

## 2023-01-05 PROCEDURE — 99348 HOME/RES VST EST LOW MDM 30: CPT | Performed by: NURSE PRACTITIONER

## 2023-01-07 LAB — TSH SERPL DL<=0.05 MIU/L-ACNC: 0.42 UIU/ML (ref 0.44–3.86)

## 2023-01-11 LAB
REASON FOR REJECTION: NORMAL
REJECTED TEST: NORMAL

## 2023-01-12 LAB
BILIRUBIN URINE: NEGATIVE
BLOOD, URINE: NEGATIVE
CLARITY: CLEAR
COLOR: YELLOW
GLUCOSE URINE: NEGATIVE MG/DL
KETONES, URINE: NEGATIVE MG/DL
LEUKOCYTE ESTERASE, URINE: NEGATIVE
NITRITE, URINE: NEGATIVE
PH UA: 6.5 (ref 5–9)
PROTEIN UA: NEGATIVE MG/DL
SPECIFIC GRAVITY UA: 1.02 (ref 1–1.03)
UROBILINOGEN, URINE: 0.2 E.U./DL
VITAMIN D 25-HYDROXY: 36.9 NG/ML

## 2023-01-14 LAB
ORGANISM: ABNORMAL
URINE CULTURE, ROUTINE: ABNORMAL
URINE CULTURE, ROUTINE: ABNORMAL

## 2023-01-19 ENCOUNTER — NURSE ONLY (OUTPATIENT)
Dept: GERIATRIC MEDICINE | Age: 88
End: 2023-01-19
Payer: MEDICARE
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
## 2023-01-19 PROCEDURE — 1123F ACP DISCUSS/DSCN MKR DOCD: CPT | Performed by: NURSE PRACTITIONER

## 2023-01-19 PROCEDURE — 99349 HOME/RES VST EST MOD MDM 40: CPT | Performed by: NURSE PRACTITIONER

## 2023-01-23 NOTE — PROGRESS NOTES
[facility]  1/5/2023    Aneudy Arriaga  is a 80 y.o. in the NF being seen for a f/u of   Chief Complaint   Patient presents with    3 Month Follow-Up       HPI lives in [address] [place] [address]  Being seen monthly for chronic illnesses. Past Medical History:   Diagnosis Date    Alzheimer's dementia     Anxiety     CAD (coronary artery disease)     Dementia     Hyperlipidemia     Hypertension     Hypothyroidism     Melanoma ([address]     skin    Osteoarthritis      Past Surgical History:   Procedure Laterality Date    BACK SURGERY      BREAST SURGERY      biopsy    CORONARY ANGIOPLASTY WITH STENT PLACEMENT      HYSTERECTOMY  1960     Family History   Problem Relation Age of Onset    Cancer Brother      Social History     Socioeconomic History    Marital status:      Spouse name: Not on file    Number of children: Not on file    Years of education: Not on file    Highest education level: Not on file   Occupational History    Not on file   Tobacco Use    Smoking status: Never    Smokeless tobacco: Never   Substance and Sexual Activity    Alcohol use: No    Drug use: No    Sexual activity: Not on file   Other Topics Concern    Not on file   Social History Narrative    Not on file     Social Determinants of Health     Financial Resource Strain: Not on file   Food Insecurity: Not on file   Transportation Needs: Not on file   Physical Activity: Not on file   Stress: Not on file   Social Connections: Not on file   Intimate Partner Violence: Not on file   Housing Stability: Not on file       Allergies: Amoxil [amoxicillin], Calcium citrate, Noroxin [norfloxacin], and Tiazac [diltiazem]  NF MEDICATIONS REVIEWED    ROS:  See HPI  Constitutional: There are no reports of behavioral issues, change in appetite, fever, or weakness. No gross bleeding issues. Respiratory: denies SOB, dyspnea, dyspnea on exertion,   Cardiovascular: denies CP, lightheadedness,   GI: No reports of change of bowel habits, no N/V/D/C.    : no reports of change in bladder habits  Extremities: No reports of pain issues, no edema    Physical exam:   /70, temperature 97.6, heart rate 74, respirations 16, spo2 96%, weight 142lbs. Constitutional: Sleeping but arousable, pleasantly confused, elderly, frail female, sitting in recliner at the time of my visit, in no apparent distress. HEENT: normocephalic, [place], MMM, no cyanosis. NO neck mass visualized   Cardiovascular: Regular rate  Respiratory: unlabored respirations, no accessory muscle use noted  GI: abdomen ND  : no bladder tenderness  Extremities: no edema,PPP, RAÚL hose BLE  Psych: pleasant and able to follow simple commands, normal thought content, speech clear  Skin: Healing ecchymotic area to forehead, otherwise no visible rashes, further bruises or open areas    ASSESSMENT:     Diagnosis Orders   1. Late onset Alzheimer's disease without behavioral disturbance ([facility] [address]        2. Primary hypertension        3. Hypothyroidism, unspecified type            PLAN:   Agrees with POC   Patients mood is stable. She resides on the Assisted Living Dementia unit of this facility where she derives benefit. Continue Donepezil and Memantine as ordered. Blood pressure stable. Continue lisinopril and bystolic as ordered and will adjust as needed. Has not had a TSH that I can find since 2021. TSH in am. Continue synthroid as ordered. Return in about 3 months (around 4/5/2023), or if symptoms worsen or fail to improve, for chronic conditions. Pertinent POC, labs, have been reviewed, continue same. Encourage fluids and good nutrition. Stress fall prevention strategies. Nursing to notify Pt/POA for agreement to POC         ________________________    Marisabel Mandujano. Suzy [facility], [address], [address] [place] [address]  Office [phone]  Fax [phone]      Please note this report is partially produced by using speech recognition hardware.   It may contain errors related to the system, including grammar, punctuation and spelling as well as words and phrases that may seem inaccurate.   For any questions or concerns feel free to contact me for clarification

## 2023-02-09 ENCOUNTER — NURSE ONLY (OUTPATIENT)
Dept: GERIATRIC MEDICINE | Age: 88
End: 2023-02-09
Payer: MEDICARE

## 2023-02-09 DIAGNOSIS — U07.1 COVID-19: Primary | ICD-10-CM

## 2023-02-09 LAB
ANION GAP SERPL CALCULATED.3IONS-SCNC: 10 MEQ/L (ref 9–15)
BASOPHILS ABSOLUTE: 0 K/UL (ref 0–0.2)
BASOPHILS RELATIVE PERCENT: 0.6 %
BUN BLDV-MCNC: 15 MG/DL (ref 8–23)
CALCIUM SERPL-MCNC: 8.9 MG/DL (ref 8.5–9.9)
CHLORIDE BLD-SCNC: 99 MEQ/L (ref 95–107)
CO2: 26 MEQ/L (ref 20–31)
CREAT SERPL-MCNC: 0.72 MG/DL (ref 0.5–0.9)
EOSINOPHILS ABSOLUTE: 0 K/UL (ref 0–0.7)
EOSINOPHILS RELATIVE PERCENT: 0.5 %
GFR SERPL CREATININE-BSD FRML MDRD: >60 ML/MIN/{1.73_M2}
GLUCOSE BLD-MCNC: 152 MG/DL (ref 70–99)
HCT VFR BLD CALC: 34.8 % (ref 37–47)
HEMOGLOBIN: 11.5 G/DL (ref 12–16)
LYMPHOCYTES ABSOLUTE: 1.2 K/UL (ref 1–4.8)
LYMPHOCYTES RELATIVE PERCENT: 20.8 %
MCH RBC QN AUTO: 30.7 PG (ref 27–31.3)
MCHC RBC AUTO-ENTMCNC: 33.1 % (ref 33–37)
MCV RBC AUTO: 92.6 FL (ref 79.4–94.8)
MONOCYTES ABSOLUTE: 0.6 K/UL (ref 0.2–0.8)
MONOCYTES RELATIVE PERCENT: 11.4 %
NEUTROPHILS ABSOLUTE: 3.7 K/UL (ref 1.4–6.5)
NEUTROPHILS RELATIVE PERCENT: 66.7 %
PDW BLD-RTO: 14.5 % (ref 11.5–14.5)
PLATELET # BLD: 193 K/UL (ref 130–400)
POTASSIUM SERPL-SCNC: 3.7 MEQ/L (ref 3.4–4.9)
RBC # BLD: 3.76 M/UL (ref 4.2–5.4)
SODIUM BLD-SCNC: 135 MEQ/L (ref 135–144)
WBC # BLD: 5.6 K/UL (ref 4.8–10.8)

## 2023-02-09 PROCEDURE — 99349 HOME/RES VST EST MOD MDM 40: CPT | Performed by: NURSE PRACTITIONER

## 2023-02-09 PROCEDURE — 1123F ACP DISCUSS/DSCN MKR DOCD: CPT | Performed by: NURSE PRACTITIONER

## 2023-02-17 LAB
ANION GAP SERPL CALCULATED.3IONS-SCNC: 12 MEQ/L (ref 9–15)
BASOPHILS ABSOLUTE: 0 K/UL (ref 0–0.2)
BASOPHILS RELATIVE PERCENT: 0.6 %
BUN BLDV-MCNC: 29 MG/DL (ref 8–23)
CALCIUM SERPL-MCNC: 9.4 MG/DL (ref 8.5–9.9)
CHLORIDE BLD-SCNC: 107 MEQ/L (ref 95–107)
CO2: 26 MEQ/L (ref 20–31)
CREAT SERPL-MCNC: 0.86 MG/DL (ref 0.5–0.9)
EOSINOPHILS ABSOLUTE: 0.2 K/UL (ref 0–0.7)
EOSINOPHILS RELATIVE PERCENT: 2.8 %
GFR SERPL CREATININE-BSD FRML MDRD: >60 ML/MIN/{1.73_M2}
GLUCOSE BLD-MCNC: 179 MG/DL (ref 70–99)
HCT VFR BLD CALC: 38.6 % (ref 37–47)
HEMOGLOBIN: 12.8 G/DL (ref 12–16)
LYMPHOCYTES ABSOLUTE: 2.2 K/UL (ref 1–4.8)
LYMPHOCYTES RELATIVE PERCENT: 35.6 %
MCH RBC QN AUTO: 30.8 PG (ref 27–31.3)
MCHC RBC AUTO-ENTMCNC: 33.2 % (ref 33–37)
MCV RBC AUTO: 92.8 FL (ref 79.4–94.8)
MONOCYTES ABSOLUTE: 0.4 K/UL (ref 0.2–0.8)
MONOCYTES RELATIVE PERCENT: 6.3 %
NEUTROPHILS ABSOLUTE: 3.5 K/UL (ref 1.4–6.5)
NEUTROPHILS RELATIVE PERCENT: 54.7 %
PDW BLD-RTO: 14.6 % (ref 11.5–14.5)
PLATELET # BLD: 265 K/UL (ref 130–400)
POTASSIUM SERPL-SCNC: 4.3 MEQ/L (ref 3.4–4.9)
RBC # BLD: 4.15 M/UL (ref 4.2–5.4)
SODIUM BLD-SCNC: 145 MEQ/L (ref 135–144)
WBC # BLD: 6.3 K/UL (ref 4.8–10.8)
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
## 2023-02-21 NOTE — PROGRESS NOTES
[facility]  1/19/2023    Nadeen Sepulveda  is a 80 y.o. in the NF being seen for a acute visit for   Chief Complaint   Patient presents with    Fall     1/10/23       HPI Patient being seen today for fall at nursing facility that occurred on 1/10/23 without injury. They are eating and drinking at their baseline per nursing. They are not complaining of any un addressed pain issues. Have had no recent choking or dysphagia issues. NO agitation or unusual mental behavioral issues.      Past Medical History:   Diagnosis Date    Alzheimer's dementia     Anxiety     CAD (coronary artery disease)     Dementia     Hyperlipidemia     Hypertension     Hypothyroidism     Melanoma ([facility] [address]     skin    Osteoarthritis      Past Surgical History:   Procedure Laterality Date    BACK SURGERY      BREAST SURGERY      biopsy    CORONARY ANGIOPLASTY WITH STENT PLACEMENT      HYSTERECTOMY  1960     Family History   Problem Relation Age of Onset    Cancer Brother      Social History     Socioeconomic History    Marital status:      Spouse name: Not on file    Number of children: Not on file    Years of education: Not on file    Highest education level: Not on file   Occupational History    Not on file   Tobacco Use    Smoking status: Never    Smokeless tobacco: Never   Substance and Sexual Activity    Alcohol use: No    Drug use: No    Sexual activity: Not on file   Other Topics Concern    Not on file   Social History Narrative    Not on file     Social Determinants of Health     Financial Resource Strain: Not on file   Food Insecurity: Not on file   Transportation Needs: Not on file   Physical Activity: Not on file   Stress: Not on file   Social Connections: Not on file   Intimate Partner Violence: Not on file   Housing Stability: Not on file       Allergies: Amoxil [amoxicillin], Calcium citrate, Noroxin [norfloxacin], and Tiazac [diltiazem]  NF MEDICATIONS REVIEWED    ROS:   Constitutional: There are no reports of behavioral issues, change in appetite, fever, or increased weakness. No bleeding issues. Respiratory: denies SOB, dyspnea, dyspnea on exertion  Cardiovascular: denies CP, lightheadedness, palpitations, PND, orthopnea, or claudication. GI: No N/V/D. : no reports of dysuria, frequency or urgency  Extremities: No reports of pain issues, edema  Psych: at baseline confusion     Physical exam:   /72, temperature 97.5, heart rate 76, respirations 16, spo2 96%  Constitutional: Awake and alert sitting up in recliner, general weakness  HEENT: Normocephalic, [place],conjunctiva pink, sclera non icteric,  buccal mucosa pink and moist  Speech clear. NECK: - no cervical or clavicular lymphadenopathy, euthyroid, no mass visualized  Cardiovascular: Regular rate, and rhythm. No murmurs, gallops or rubs noted. Respiratory: LCTA, even unlabored respirations, no accessory muscle use noted  GI: abdomen NT, +BS x 4 Q, ND  : no suprapubic tenderness  Extremities: no ankle edema, PPP  SKELETAL: no redness, or swelling over joints. Limited ROM but spontaneous movement x 4   Psych: pleasantly confused, good judgement, normal thought content  SKIN: no gross skin lesions noted on visualized skin, warm and dry    ASSESSMENT:     Diagnosis Orders   1. Fall, initial encounter            PLAN:  Pt/POA agrees with POC   Patient does not recall the events surrounding her fall  Patient denies any injury  We will continue to monitor    Return in about 3 months (around 4/19/2023), or if symptoms worsen or fail to improve, for chronic conditions. Please note this report is partially produced by using speech recognition hardware. It may contain errors related to the system, including grammar, punctuation and spelling as well as words and phrases that may seem inaccurate.   For any questions or concerns feel free to contact me for clarification       THAO Betts - CNP

## 2023-02-24 LAB
ANION GAP SERPL CALCULATED.3IONS-SCNC: 10 MEQ/L (ref 9–15)
BASOPHILS ABSOLUTE: 0 K/UL (ref 0–0.2)
BASOPHILS RELATIVE PERCENT: 0.7 %
BUN BLDV-MCNC: 15 MG/DL (ref 8–23)
CALCIUM SERPL-MCNC: 9.3 MG/DL (ref 8.5–9.9)
CHLORIDE BLD-SCNC: 106 MEQ/L (ref 95–107)
CO2: 25 MEQ/L (ref 20–31)
CREAT SERPL-MCNC: 0.64 MG/DL (ref 0.5–0.9)
EOSINOPHILS ABSOLUTE: 0.2 K/UL (ref 0–0.7)
EOSINOPHILS RELATIVE PERCENT: 3.4 %
GFR SERPL CREATININE-BSD FRML MDRD: >60 ML/MIN/{1.73_M2}
GLUCOSE BLD-MCNC: 96 MG/DL (ref 70–99)
HCT VFR BLD CALC: 37.5 % (ref 37–47)
HEMOGLOBIN: 12.5 G/DL (ref 12–16)
LYMPHOCYTES ABSOLUTE: 2 K/UL (ref 1–4.8)
LYMPHOCYTES RELATIVE PERCENT: 33.2 %
MCH RBC QN AUTO: 30.7 PG (ref 27–31.3)
MCHC RBC AUTO-ENTMCNC: 33.4 % (ref 33–37)
MCV RBC AUTO: 92 FL (ref 79.4–94.8)
MONOCYTES ABSOLUTE: 0.4 K/UL (ref 0.2–0.8)
MONOCYTES RELATIVE PERCENT: 6.9 %
NEUTROPHILS ABSOLUTE: 3.3 K/UL (ref 1.4–6.5)
NEUTROPHILS RELATIVE PERCENT: 55.8 %
PDW BLD-RTO: 14.2 % (ref 11.5–14.5)
PLATELET # BLD: 205 K/UL (ref 130–400)
POTASSIUM SERPL-SCNC: 4.4 MEQ/L (ref 3.4–4.9)
RBC # BLD: 4.08 M/UL (ref 4.2–5.4)
SODIUM BLD-SCNC: 141 MEQ/L (ref 135–144)
WBC # BLD: 6 K/UL (ref 4.8–10.8)

## 2023-03-07 ENCOUNTER — NURSE ONLY (OUTPATIENT)
Dept: GERIATRIC MEDICINE | Age: 88
End: 2023-03-07

## 2023-03-07 DIAGNOSIS — M15.9 OSTEOARTHRITIS OF MULTIPLE JOINTS, UNSPECIFIED OSTEOARTHRITIS TYPE: ICD-10-CM

## 2023-03-07 DIAGNOSIS — R53.1 WEAKNESS: ICD-10-CM

## 2023-03-07 DIAGNOSIS — F41.1 GENERALIZED ANXIETY DISORDER: ICD-10-CM

## 2023-03-07 DIAGNOSIS — E55.9 VITAMIN D DEFICIENCY: ICD-10-CM

## 2023-03-07 DIAGNOSIS — E78.5 HYPERLIPIDEMIA, UNSPECIFIED HYPERLIPIDEMIA TYPE: ICD-10-CM

## 2023-03-07 DIAGNOSIS — I10 PRIMARY HYPERTENSION: ICD-10-CM

## 2023-03-07 DIAGNOSIS — I25.10 CORONARY ARTERY DISEASE INVOLVING NATIVE CORONARY ARTERY OF NATIVE HEART WITHOUT ANGINA PECTORIS: ICD-10-CM

## 2023-03-07 DIAGNOSIS — Z91.81 H/O FALLING: ICD-10-CM

## 2023-03-07 DIAGNOSIS — G30.1 LATE ONSET ALZHEIMER'S DISEASE WITHOUT BEHAVIORAL DISTURBANCE (HCC): Primary | ICD-10-CM

## 2023-03-07 DIAGNOSIS — E03.9 HYPOTHYROIDISM, UNSPECIFIED TYPE: ICD-10-CM

## 2023-03-07 DIAGNOSIS — F02.80 LATE ONSET ALZHEIMER'S DISEASE WITHOUT BEHAVIORAL DISTURBANCE (HCC): Primary | ICD-10-CM

## 2023-03-07 NOTE — PROGRESS NOTES
[facility]  [address]  [place], [address]  P: [phone]          2023    HPI:    Al Whitley (:  1931) has requested an evaluation for the following concern(s):      Acutevisit for CC of COVID-19. DX ON DATE 23  Pt/Nurse noted  they tested positive for covid during screening on 23. Review of Systems  ROS: Nurse/pt reports   Constitutional: There are no reports of behavioral issues,   Had no decrease in appetite, and no fever, is now weaker. Had  no runny nose, sore throat, and  no cough. No c/o loss of taste or smell,  Respiratory: No c/o SOB, dyspnea, dyspnea on exertion, change in exercise capacity  Cardiovascular: denies CP, lightheadedness, palpitations, PND, orthopnea, or claudication. GI: No N/V/D. : no reports of dysuria  Extremities: No reports of pain issues, no edema     Prior to Visit Medications    Medication Sig Taking?  Authorizing Provider   lisinopril (PRINIVIL;ZESTRIL) 40 MG tablet Take 0.5 tablets by mouth daily  THAO Ayon CNP   amLODIPine (NORVASC) 5 MG tablet Take 1 tablet by mouth daily  THAO Ayon CNP   nebivolol (BYSTOLIC) 10 MG tablet Take 1 tablet by mouth daily  THAO Ayon CNP   enoxaparin (LOVENOX) 30 MG/0.3ML injection Inject into the skin 0.3mL every 12 hours  Historical Provider, MD   vitamin C (ASCORBIC ACID) 500 MG tablet Take 500 mg by mouth 2 times daily  Historical Provider, MD   zinc sulfate (ZINCATE) 220 (50 Zn) MG capsule Take 50 mg by mouth daily  Historical Provider, MD   acetaminophen (TYLENOL) 325 MG tablet Take 650 mg by mouth 3 times daily  Historical Provider, MD   aspirin 81 MG tablet Take 81 mg by mouth daily  Historical Provider, MD   atorvastatin (LIPITOR) 10 MG tablet Take 10 mg by mouth daily  Historical Provider, MD   Calcium 600-200 MG-UNIT TABS Take 1 tablet by mouth daily   Historical Provider, MD   Multiple Vitamins-Minerals (CENTRUM SILVER) TABS Take 1 tablet by mouth daily   Historical Provider, MD   donepezil (ARICEPT) 5 MG tablet Take 5 mg by mouth nightly  Historical Provider, MD   levothyroxine (SYNTHROID) 25 MCG tablet Take 25 mcg by mouth See Admin Instructions MONDAYS;  GIVE 12.5 MG BY MOUTH ALL OTHER DAYS  Historical Provider, MD   memantine ER (NAMENDA XR) 14 MG CP24 extended release capsule Take 14 mg by mouth daily  Historical Provider, MD   nitroGLYCERIN (NITROSTAT) 0.4 MG SL tablet Place 0.4 mg under the tongue every 5 minutes as needed for Chest pain up to max of 3 total doses. If no relief after 1 dose, call 911.   Historical Provider, MD   Cholecalciferol (VITAMIN D3) 1000 units TABS Take 1,000 Units by mouth daily   Historical Provider, MD   acetaminophen (TYLENOL) 325 MG tablet Take 650 mg by mouth daily as needed for Pain  Historical Provider, MD   Calcium 500 MG CHEW chewable tablet Take 1,000 mg by mouth 3 times daily as needed  Historical Provider, MD       NF MEDICATIONS REVIEWED AND ALLERGIES REVIEWED IN NF CHART    Social History     Tobacco Use    Smoking status: Never    Smokeless tobacco: Never   Substance Use Topics    Alcohol use: No    Drug use: No        Allergies   Allergen Reactions    Amoxil [Amoxicillin]     Calcium Citrate     Noroxin [Norfloxacin]     Tiazac [Diltiazem]    ,   Past Medical History:   Diagnosis Date    Alzheimer's dementia     Anxiety     CAD (coronary artery disease)     Dementia     Hyperlipidemia     Hypertension     Hypothyroidism     Melanoma ([facility] [address]     skin    Osteoarthritis    ,   Past Surgical History:   Procedure Laterality Date    BACK SURGERY      BREAST SURGERY      biopsy    CORONARY ANGIOPLASTY WITH STENT PLACEMENT      HYSTERECTOMY  1960       PHYSICAL EXAMINATION:  Blood pressure-124/62 heart rate-74   respiratory rate-19   temperature-97.9 pulse oximetry- 93% on room air    Constitutional: [x] Appears well-developed and well-nourished [x] No apparent distress        Mental status  [x] Alert and awake  [] Oriented to person/place/time []Able to follow commands    She is PLEASANTLY CONFUSED AT BASELINE    Speech is clear, and appropriate      Eyes:  EOM    [x]  Normal  [] Abnormal-  Sclera  [x]  Normal  [] Abnormal -         Discharge [x]  None visible  [] Abnormal -    HENT:   [x] Normocephalic, atraumatic. [] Abnormal   [x] Mouth/Throat: Mucous membranes are moist.     External Ears [x] Normal  [] Abnormal-     Neck: [x] No visualized mass     Pulmonary/Chest:   Heart sounds    regular rate and rhythm, no murmurs, gallops, or rubs  Lungs  sounds   CTA   [x] Respiratory effort normal.  [x] No visualized signs of difficulty breathing or respiratory distress        [] Abnormal-      Musculoskeletal:   [] Normal gait with no signs of ataxia         [] Normal range of motion of neck        [x] Abnormal-   LIMITED ROM AT BASELINE     Neurological:             [x] Abnormal- A&O x1, can follow simple commands        Skin:        [x] No significant exanthematous lesions or discoloration noted on facial skin         [x] Abnormal- healing ecchymotic area forehead           Psychiatric:       [x] Normal Affect [x] No Hallucinations                   Other pertinent observable physical exam findings-     ASSESSMENT/PLAN:     Diagnosis Orders   1. COVID-19            Standing orders for COVID -19 labs on diagnosis AND q week:  CBC  BMP    CXR ON DIAGNOSIS    medications:  Vit D3 1000 units po bid  VIt C 500mg po bid  Zinc 50mg po daily  Pepcid 20mg po daily IF NOT on PPI    Continue same meds and POC    Return in about 3 months (around 5/9/2023), or if symptoms worsen or fail to improve, for chronic conditions. Pt/POA agrees to THAO Freeman CNP on 3/7/2023 at 7:39 AM    Please note this report is partially produced by using speech recognition hardware.   It may contain errors related to the system, including grammar, punctuation and spelling as well as words and phrases that may seem inaccurate.   For any questions or concerns feel free to contact me for clarification
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
## 2023-03-28 ENCOUNTER — NURSE ONLY (OUTPATIENT)
Dept: GERIATRIC MEDICINE | Age: 88
End: 2023-03-28

## 2023-03-28 DIAGNOSIS — F02.80 LATE ONSET ALZHEIMER'S DISEASE WITHOUT BEHAVIORAL DISTURBANCE (HCC): Primary | ICD-10-CM

## 2023-03-28 DIAGNOSIS — G30.1 LATE ONSET ALZHEIMER'S DISEASE WITHOUT BEHAVIORAL DISTURBANCE (HCC): Primary | ICD-10-CM

## 2023-03-31 PROBLEM — E55.9 VITAMIN D DEFICIENCY: Status: ACTIVE | Noted: 2023-03-31

## 2023-03-31 PROBLEM — Z91.81 H/O FALLING: Status: ACTIVE | Noted: 2023-03-31

## 2023-03-31 PROBLEM — R53.1 WEAKNESS: Status: ACTIVE | Noted: 2023-03-31

## 2023-03-31 NOTE — PROGRESS NOTES
is effective in helping with anxiety. 8.  Most recent vitamin D level 36.9. Continue vitamin D maintenance as ordered. 9.   Patient has become more dependent on nursing staff for her ADL's. Pt/POA to be notified by nursing for agreement with POC     Return in about 1 year (around 3/7/2024). ________________________    [facility]man [facility], [address] [place] [address]  Office [phone]  Fax [phone]    Please note this report is partially produced by using speech recognition hardware. It may contain errors related to the system, including grammar, punctuation and spelling as well as words and phrases that may seem inaccurate.   For any questions or concerns feel free to contact me for clarification

## 2023-04-12 ENCOUNTER — NURSE ONLY (OUTPATIENT)
Dept: GERIATRIC MEDICINE | Age: 88
End: 2023-04-12

## 2023-04-12 DIAGNOSIS — F02.80 LATE ONSET ALZHEIMER'S DISEASE WITHOUT BEHAVIORAL DISTURBANCE (HCC): ICD-10-CM

## 2023-04-12 DIAGNOSIS — G30.1 LATE ONSET ALZHEIMER'S DISEASE WITHOUT BEHAVIORAL DISTURBANCE (HCC): ICD-10-CM

## 2023-04-12 DIAGNOSIS — E03.9 HYPOTHYROIDISM, UNSPECIFIED TYPE: Primary | ICD-10-CM

## 2023-04-12 DIAGNOSIS — I10 PRIMARY HYPERTENSION: ICD-10-CM

## 2023-04-17 RX ORDER — LORAZEPAM 0.5 MG/1
0.5 TABLET ORAL DAILY PRN
COMMUNITY

## 2023-04-17 RX ORDER — LOPERAMIDE HYDROCHLORIDE 2 MG/1
2 TABLET ORAL 4 TIMES DAILY PRN
COMMUNITY

## 2023-04-19 ENCOUNTER — OFFICE VISIT (OUTPATIENT)
Dept: GERIATRIC MEDICINE | Age: 88
End: 2023-04-19

## 2023-04-19 DIAGNOSIS — F02.80 LATE ONSET ALZHEIMER'S DISEASE WITHOUT BEHAVIORAL DISTURBANCE (HCC): Primary | ICD-10-CM

## 2023-04-19 DIAGNOSIS — R53.1 WEAKNESS: ICD-10-CM

## 2023-04-19 DIAGNOSIS — E03.9 HYPOTHYROIDISM, UNSPECIFIED TYPE: ICD-10-CM

## 2023-04-19 DIAGNOSIS — G30.1 LATE ONSET ALZHEIMER'S DISEASE WITHOUT BEHAVIORAL DISTURBANCE (HCC): Primary | ICD-10-CM

## 2023-04-19 DIAGNOSIS — R41.82 ALTERED MENTAL STATUS, UNSPECIFIED ALTERED MENTAL STATUS TYPE: ICD-10-CM

## 2023-04-20 NOTE — PROGRESS NOTES
by using speech recognition hardware. It may contain errors related to the system, including grammar, punctuation and spelling as well as words and phrases that may seem inaccurate.   For any questions or concerns feel free to contact me for clarification       THAO Bowman - CNP

## 2023-04-28 LAB
ANION GAP SERPL CALCULATED.3IONS-SCNC: 12 MEQ/L (ref 9–15)
BASOPHILS # BLD: 0.1 K/UL (ref 0–0.2)
BASOPHILS NFR BLD: 0.9 %
BUN SERPL-MCNC: 31 MG/DL (ref 8–23)
CALCIUM SERPL-MCNC: 9.5 MG/DL (ref 8.5–9.9)
CHLORIDE SERPL-SCNC: 105 MEQ/L (ref 95–107)
CO2 SERPL-SCNC: 24 MEQ/L (ref 20–31)
CREAT SERPL-MCNC: 0.92 MG/DL (ref 0.5–0.9)
EOSINOPHIL # BLD: 0.4 K/UL (ref 0–0.7)
EOSINOPHIL NFR BLD: 4.3 %
ERYTHROCYTE [DISTWIDTH] IN BLOOD BY AUTOMATED COUNT: 14.3 % (ref 11.5–14.5)
GLUCOSE SERPL-MCNC: 146 MG/DL (ref 70–99)
HCT VFR BLD AUTO: 42 % (ref 37–47)
HGB BLD-MCNC: 13.7 G/DL (ref 12–16)
LYMPHOCYTES # BLD: 3.2 K/UL (ref 1–4.8)
LYMPHOCYTES NFR BLD: 36.4 %
MCH RBC QN AUTO: 30 PG (ref 27–31.3)
MCHC RBC AUTO-ENTMCNC: 32.6 % (ref 33–37)
MCV RBC AUTO: 91.9 FL (ref 79.4–94.8)
MONOCYTES # BLD: 0.5 K/UL (ref 0.2–0.8)
MONOCYTES NFR BLD: 5.6 %
NEUTROPHILS # BLD: 4.7 K/UL (ref 1.4–6.5)
NEUTS SEG NFR BLD: 52.8 %
PLATELET # BLD AUTO: 241 K/UL (ref 130–400)
POTASSIUM SERPL-SCNC: 3.9 MEQ/L (ref 3.4–4.9)
RBC # BLD AUTO: 4.57 M/UL (ref 4.2–5.4)
SODIUM SERPL-SCNC: 141 MEQ/L (ref 135–144)
WBC # BLD AUTO: 8.9 K/UL (ref 4.8–10.8)

## 2023-05-02 ENCOUNTER — OFFICE VISIT (OUTPATIENT)
Dept: GERIATRIC MEDICINE | Age: 88
End: 2023-05-02
Payer: MEDICARE

## 2023-05-02 DIAGNOSIS — I10 PRIMARY HYPERTENSION: Primary | ICD-10-CM

## 2023-05-02 DIAGNOSIS — M15.9 OSTEOARTHRITIS OF MULTIPLE JOINTS, UNSPECIFIED OSTEOARTHRITIS TYPE: ICD-10-CM

## 2023-05-02 DIAGNOSIS — I25.10 CORONARY ARTERY DISEASE INVOLVING NATIVE CORONARY ARTERY OF NATIVE HEART WITHOUT ANGINA PECTORIS: ICD-10-CM

## 2023-05-02 PROCEDURE — 1123F ACP DISCUSS/DSCN MKR DOCD: CPT | Performed by: NURSE PRACTITIONER

## 2023-05-02 PROCEDURE — 99308 SBSQ NF CARE LOW MDM 20: CPT | Performed by: NURSE PRACTITIONER

## 2023-05-02 NOTE — PROGRESS NOTES
[facility]  5/2/2023    Yuriy Roque  is a 80 y.o. in the NF being seen for a f/u of   Chief Complaint   Patient presents with    1 Month Follow-Up       HPI lives in [address] [place] [address]  Being seen monthly for chronic illnesses. Past Medical History:   Diagnosis Date    Alzheimer's dementia     Anxiety     CAD (coronary artery disease)     Dementia     Hyperlipidemia     Hypertension     Hypothyroidism     Melanoma ([address]     skin    Osteoarthritis      Past Surgical History:   Procedure Laterality Date    BACK SURGERY      BREAST SURGERY      biopsy    CORONARY ANGIOPLASTY WITH STENT PLACEMENT      HYSTERECTOMY  1960     Family History   Problem Relation Age of Onset    Cancer Brother      Social History     Socioeconomic History    Marital status:      Spouse name: Not on file    Number of children: Not on file    Years of education: Not on file    Highest education level: Not on file   Occupational History    Not on file   Tobacco Use    Smoking status: Never    Smokeless tobacco: Never   Substance and Sexual Activity    Alcohol use: No    Drug use: No    Sexual activity: Not on file   Other Topics Concern    Not on file   Social History Narrative    Not on file     Social Determinants of Health     Financial Resource Strain: Not on file   Food Insecurity: Not on file   Transportation Needs: Not on file   Physical Activity: Not on file   Stress: Not on file   Social Connections: Not on file   Intimate Partner Violence: Not on file   Housing Stability: Not on file       Allergies: Amoxil [amoxicillin], Calcium citrate, Noroxin [norfloxacin], and Tiazac [diltiazem]  NF MEDICATIONS REVIEWED    ROS:  See HPI, information obtained from nursing staff as patient unable to provide a coherent narrative. Constitutional: There are no reports of behavioral issues, change in appetite, fever, or weakness. No gross bleeding issues.   Respiratory: No reports of  SOB, dyspnea, dyspnea on exertion,   Cardiovascular:

## 2023-05-03 LAB
ANION GAP SERPL CALCULATED.3IONS-SCNC: 15 MEQ/L (ref 9–15)
BUN SERPL-MCNC: 21 MG/DL (ref 8–23)
CALCIUM SERPL-MCNC: 9.3 MG/DL (ref 8.5–9.9)
CHLORIDE SERPL-SCNC: 105 MEQ/L (ref 95–107)
CO2 SERPL-SCNC: 22 MEQ/L (ref 20–31)
CREAT SERPL-MCNC: 0.79 MG/DL (ref 0.5–0.9)
ERYTHROCYTE [DISTWIDTH] IN BLOOD BY AUTOMATED COUNT: 14.2 % (ref 11.5–14.5)
GLUCOSE SERPL-MCNC: 101 MG/DL (ref 70–99)
HCT VFR BLD AUTO: 37.5 % (ref 37–47)
HGB BLD-MCNC: 12.8 G/DL (ref 12–16)
MCH RBC QN AUTO: 31.4 PG (ref 27–31.3)
MCHC RBC AUTO-ENTMCNC: 34.1 % (ref 33–37)
MCV RBC AUTO: 91.9 FL (ref 79.4–94.8)
PLATELET # BLD AUTO: 197 K/UL (ref 130–400)
POTASSIUM SERPL-SCNC: 4.2 MEQ/L (ref 3.4–4.9)
RBC # BLD AUTO: 4.08 M/UL (ref 4.2–5.4)
SODIUM SERPL-SCNC: 142 MEQ/L (ref 135–144)
WBC # BLD AUTO: 8.5 K/UL (ref 4.8–10.8)

## 2023-05-12 NOTE — PROGRESS NOTES
Health     Financial Resource Strain: Not on file   Food Insecurity: Not on file   Transportation Needs: Not on file   Physical Activity: Not on file   Stress: Not on file   Social Connections: Not on file   Intimate Partner Violence: Not on file   Housing Stability: Not on file         No results found for: LABA1C  No results found for: EAG    Lab Results   Component Value Date/Time     05/03/2023 11:05 AM    K 4.2 05/03/2023 11:05 AM     05/03/2023 11:05 AM    CO2 22 05/03/2023 11:05 AM    BUN 21 05/03/2023 11:05 AM    CREATININE 0.79 05/03/2023 11:05 AM    GLUCOSE 101 05/03/2023 11:05 AM    GLUCOSE 171 09/03/2020 07:40 PM    CALCIUM 9.3 05/03/2023 11:05 AM        Lab Results   Component Value Date    CHOL 154 01/21/2021    CHOL 147 08/08/2017     Lab Results   Component Value Date    TRIG 234 (H) 01/21/2021    TRIG 99 08/08/2017     Lab Results   Component Value Date    HDL 40 01/21/2021    HDL 67 (H) 08/08/2017     Lab Results   Component Value Date    LDLCALC 67 01/21/2021    LDLCALC 60 08/08/2017     No results found for: LABVLDL, VLDL  No results found for: CHOLHDLRATIO    Lab Results   Component Value Date    TSH 0.422 (L) 01/07/2023       Lab Results   Component Value Date    WBC 8.5 05/03/2023    HGB 12.8 05/03/2023    HCT 37.5 05/03/2023    MCV 91.9 05/03/2023     05/03/2023       Please note orders entered on site at facility after discussion with appropriate facility nursing/therapy/ / nutritional staff. Current longstanding medical problems and acute medical issues addressed with staff. Available data and data elements in on site paper chart reviewed and analyzed. Current external consultant notes reviewed in on site chart. Ordered laboratory testing and imaging will be reviewed when available.

## 2023-06-08 ENCOUNTER — OFFICE VISIT (OUTPATIENT)
Dept: GERIATRIC MEDICINE | Age: 88
End: 2023-06-08

## 2023-06-08 DIAGNOSIS — F02.80 LATE ONSET ALZHEIMER'S DISEASE WITHOUT BEHAVIORAL DISTURBANCE (HCC): Primary | ICD-10-CM

## 2023-06-08 DIAGNOSIS — G30.1 LATE ONSET ALZHEIMER'S DISEASE WITHOUT BEHAVIORAL DISTURBANCE (HCC): Primary | ICD-10-CM

## 2023-06-08 DIAGNOSIS — I10 PRIMARY HYPERTENSION: ICD-10-CM

## 2023-06-08 DIAGNOSIS — M15.9 OSTEOARTHRITIS OF MULTIPLE JOINTS, UNSPECIFIED OSTEOARTHRITIS TYPE: ICD-10-CM

## 2023-07-07 NOTE — PROGRESS NOTES
SUBJECTIVE:  This 80-year-old woman seen problems for dementia hypertension) no recent nausea has been coughing. No recent change in her bowel bladder no bleeding diathesis oral intake has been poor no recent falls no recent lightheadedness. ROS: Limited by cognition  The rest of the 14 point ROS negative    PHYSICAL EXAM: VSS per facility record  Pupils are small with are reactive oral Koza moist chest tender crackles and cardiovascular showed a regular abdomen soft EXTR trace edema skin no new rash    ASSESSMENT & PLAN:   Diagnosis Orders   1. Late onset Alzheimer's disease without behavioral disturbance ([address])        2. Primary hypertension        3. Osteoarthritis of multiple joints, unspecified osteoarthritis type          Continue anti-inflammatory agents due to bleeding diathesis. Recommendations able. Continue current anti-inflammatory agents review antihypertensive regimen. Past Medical History:   Diagnosis Date    Alzheimer's dementia     Anxiety     CAD (coronary artery disease)     Dementia     Hyperlipidemia     Hypertension     Hypothyroidism     Melanoma ([address])     skin    Osteoarthritis          Past Surgical History:   Procedure Laterality Date    BACK SURGERY      BREAST SURGERY      biopsy    CORONARY ANGIOPLASTY WITH STENT PLACEMENT      HYSTERECTOMY  1960         Current Outpatient Medications on File Prior to Visit   Medication Sig Dispense Refill    loperamide (IMODIUM A-D) 2 MG tablet Take 1 tablet by mouth 4 times daily as needed for Diarrhea      LORazepam (ATIVAN) 0.5 MG tablet Take 1 tablet by mouth daily as needed for Anxiety.  Max Daily Amount: 0.5 mg      lisinopril (PRINIVIL;ZESTRIL) 40 MG tablet Take 0.5 tablets by mouth daily (Patient taking differently: Take 0.5 tablets by mouth daily Indications: High Blood Pressure Disorder) 30 tablet 3    amLODIPine (NORVASC) 5 MG tablet Take 1 tablet by mouth daily (Patient taking differently: Take 1 tablet by mouth daily

## 2023-07-10 LAB — TSH SERPL-MCNC: 0.81 UIU/ML (ref 0.44–3.86)

## 2023-07-21 ENCOUNTER — OFFICE VISIT (OUTPATIENT)
Dept: GERIATRIC MEDICINE | Age: 88
End: 2023-07-21
Payer: MEDICARE

## 2023-07-21 DIAGNOSIS — F02.80 LATE ONSET ALZHEIMER'S DISEASE WITHOUT BEHAVIORAL DISTURBANCE (HCC): Primary | ICD-10-CM

## 2023-07-21 DIAGNOSIS — G30.1 LATE ONSET ALZHEIMER'S DISEASE WITHOUT BEHAVIORAL DISTURBANCE (HCC): Primary | ICD-10-CM

## 2023-07-21 DIAGNOSIS — I10 PRIMARY HYPERTENSION: ICD-10-CM

## 2023-07-21 DIAGNOSIS — R53.1 WEAKNESS: ICD-10-CM

## 2023-07-21 PROCEDURE — 1123F ACP DISCUSS/DSCN MKR DOCD: CPT | Performed by: NURSE PRACTITIONER

## 2023-07-21 PROCEDURE — 99308 SBSQ NF CARE LOW MDM 20: CPT | Performed by: NURSE PRACTITIONER

## 2023-07-27 NOTE — PROGRESS NOTES
[facility]  7/21/2023    Mendel Perez  is a 80 y.o. in the NF being seen for a f/u of   Chief Complaint   Patient presents with    1 Month Follow-Up       HPI lives in [address] [place] [address]  Being seen monthly for chronic illnesses. Past Medical History:   Diagnosis Date    Alzheimer's dementia     Anxiety     CAD (coronary artery disease)     Dementia     Hyperlipidemia     Hypertension     Hypothyroidism     Melanoma ([address])     skin    Osteoarthritis      Past Surgical History:   Procedure Laterality Date    BACK SURGERY      BREAST SURGERY      biopsy    CORONARY ANGIOPLASTY WITH STENT PLACEMENT      HYSTERECTOMY  1960     Family History   Problem Relation Age of Onset    Cancer Brother      Social History     Socioeconomic History    Marital status:      Spouse name: Not on file    Number of children: Not on file    Years of education: Not on file    Highest education level: Not on file   Occupational History    Not on file   Tobacco Use    Smoking status: Never    Smokeless tobacco: Never   Substance and Sexual Activity    Alcohol use: No    Drug use: No    Sexual activity: Not on file   Other Topics Concern    Not on file   Social History Narrative    Not on file     Social Determinants of Health     Financial Resource Strain: Not on file   Food Insecurity: Not on file   Transportation Needs: Not on file   Physical Activity: Not on file   Stress: Not on file   Social Connections: Not on file   Intimate Partner Violence: Not on file   Housing Stability: Not on file       Allergies: Amoxil [amoxicillin], Calcium citrate, Noroxin [norfloxacin], and Tiazac [diltiazem]  NF MEDICATIONS REVIEWED    ROS:  See HPI  Constitutional: There are no reports of behavioral issues, change in appetite, fever, or weakness. No gross bleeding issues. Respiratory: No reports of SOB, dyspnea, dyspnea on exertion,   Cardiovascular: No reports of CP, lightheadedness,   GI: No reports of change of bowel habits, no N/V/D/C.

## 2023-09-14 ENCOUNTER — OFFICE VISIT (OUTPATIENT)
Dept: GERIATRIC MEDICINE | Age: 88
End: 2023-09-14

## 2023-09-14 DIAGNOSIS — I10 PRIMARY HYPERTENSION: ICD-10-CM

## 2023-09-14 DIAGNOSIS — G30.1 LATE ONSET ALZHEIMER'S DISEASE WITHOUT BEHAVIORAL DISTURBANCE (HCC): Primary | ICD-10-CM

## 2023-09-14 DIAGNOSIS — F02.80 LATE ONSET ALZHEIMER'S DISEASE WITHOUT BEHAVIORAL DISTURBANCE (HCC): Primary | ICD-10-CM

## 2023-09-14 DIAGNOSIS — M15.9 OSTEOARTHRITIS OF MULTIPLE JOINTS, UNSPECIFIED OSTEOARTHRITIS TYPE: ICD-10-CM

## 2023-09-15 LAB
ALBUMIN SERPL-MCNC: 4 G/DL (ref 3.5–4.6)
ALP SERPL-CCNC: 79 U/L (ref 40–130)
ALT SERPL-CCNC: 11 U/L (ref 0–33)
ANION GAP SERPL CALCULATED.3IONS-SCNC: 10 MEQ/L (ref 9–15)
AST SERPL-CCNC: 13 U/L (ref 0–35)
BASOPHILS # BLD: 0 K/UL (ref 0–0.2)
BASOPHILS NFR BLD: 0.6 %
BILIRUB SERPL-MCNC: <0.2 MG/DL (ref 0.2–0.7)
BUN SERPL-MCNC: 35 MG/DL (ref 8–23)
CALCIUM SERPL-MCNC: 9.4 MG/DL (ref 8.5–9.9)
CHLORIDE SERPL-SCNC: 106 MEQ/L (ref 95–107)
CHOLEST SERPL-MCNC: 125 MG/DL (ref 0–199)
CO2 SERPL-SCNC: 24 MEQ/L (ref 20–31)
CREAT SERPL-MCNC: 0.8 MG/DL (ref 0.5–0.9)
EOSINOPHIL # BLD: 0.3 K/UL (ref 0–0.7)
EOSINOPHIL NFR BLD: 5.2 %
ERYTHROCYTE [DISTWIDTH] IN BLOOD BY AUTOMATED COUNT: 13.4 % (ref 11.5–14.5)
GLOBULIN SER CALC-MCNC: 2.5 G/DL (ref 2.3–3.5)
GLUCOSE SERPL-MCNC: 96 MG/DL (ref 70–99)
HCT VFR BLD AUTO: 35.9 % (ref 37–47)
HDLC SERPL-MCNC: 40 MG/DL (ref 40–59)
HGB BLD-MCNC: 12 G/DL (ref 12–16)
LDLC SERPL CALC-MCNC: 52 MG/DL (ref 0–129)
LYMPHOCYTES # BLD: 2.4 K/UL (ref 1–4.8)
LYMPHOCYTES NFR BLD: 40.4 %
MCH RBC QN AUTO: 31.4 PG (ref 27–31.3)
MCHC RBC AUTO-ENTMCNC: 33.3 % (ref 33–37)
MCV RBC AUTO: 94.1 FL (ref 79.4–94.8)
MONOCYTES # BLD: 0.6 K/UL (ref 0.2–0.8)
MONOCYTES NFR BLD: 9.5 %
NEUTROPHILS # BLD: 2.6 K/UL (ref 1.4–6.5)
NEUTS SEG NFR BLD: 44.3 %
PLATELET # BLD AUTO: 213 K/UL (ref 130–400)
POTASSIUM SERPL-SCNC: 4.5 MEQ/L (ref 3.4–4.9)
PROT SERPL-MCNC: 6.5 G/DL (ref 6.3–8)
RBC # BLD AUTO: 3.81 M/UL (ref 4.2–5.4)
SODIUM SERPL-SCNC: 140 MEQ/L (ref 135–144)
TRIGL SERPL-MCNC: 167 MG/DL (ref 0–150)
TSH SERPL-MCNC: 0.34 UIU/ML (ref 0.44–3.86)
WBC # BLD AUTO: 6 K/UL (ref 4.8–10.8)

## 2023-09-16 LAB — VITAMIN D 25-HYDROXY: 45.9 NG/ML (ref 30–100)

## 2023-09-30 NOTE — PROGRESS NOTES
[facility]  9/14/2023    Blanca Carlos  is a 80 y.o. in the NF being seen for a f/u of   Chief Complaint   Patient presents with    1 Month Follow-Up       HPI lives in [address] [place] [address]  Being seen monthly for chronic illnesses. Past Medical History:   Diagnosis Date    Alzheimer's dementia     Anxiety     CAD (coronary artery disease)     Dementia     Hyperlipidemia     Hypertension     Hypothyroidism     Melanoma ([address])     skin    Osteoarthritis      Past Surgical History:   Procedure Laterality Date    BACK SURGERY      BREAST SURGERY      biopsy    CORONARY ANGIOPLASTY WITH STENT PLACEMENT      HYSTERECTOMY  1960     Family History   Problem Relation Age of Onset    Cancer Brother      Social History     Socioeconomic History    Marital status:      Spouse name: Not on file    Number of children: Not on file    Years of education: Not on file    Highest education level: Not on file   Occupational History    Not on file   Tobacco Use    Smoking status: Never    Smokeless tobacco: Never   Substance and Sexual Activity    Alcohol use: No    Drug use: No    Sexual activity: Not on file   Other Topics Concern    Not on file   Social History Narrative    Not on file     Social Determinants of Health     Financial Resource Strain: Not on file   Food Insecurity: Not on file   Transportation Needs: Not on file   Physical Activity: Not on file   Stress: Not on file   Social Connections: Not on file   Intimate Partner Violence: Not on file   Housing Stability: Not on file       Allergies: Amoxil [amoxicillin], Calcium citrate, Noroxin [norfloxacin], and Tiazac [diltiazem]  NF MEDICATIONS REVIEWED    ROS:  See HPI  Constitutional: There are no reports of behavioral issues, change in appetite, fever, or weakness. No gross bleeding issues. Respiratory: denies SOB, dyspnea, dyspnea on exertion,   Cardiovascular: denies CP, lightheadedness,   GI: No reports of change of bowel habits, no N/V/D/C.    : no

## 2023-10-19 ENCOUNTER — OFFICE VISIT (OUTPATIENT)
Dept: GERIATRIC MEDICINE | Age: 88
End: 2023-10-19

## 2023-10-19 DIAGNOSIS — I10 PRIMARY HYPERTENSION: ICD-10-CM

## 2023-10-19 DIAGNOSIS — G30.1 LATE ONSET ALZHEIMER'S DISEASE WITHOUT BEHAVIORAL DISTURBANCE (HCC): ICD-10-CM

## 2023-10-19 DIAGNOSIS — F02.80 LATE ONSET ALZHEIMER'S DISEASE WITHOUT BEHAVIORAL DISTURBANCE (HCC): ICD-10-CM

## 2023-10-19 DIAGNOSIS — E03.9 HYPOTHYROIDISM, UNSPECIFIED TYPE: Primary | ICD-10-CM

## 2023-11-16 NOTE — PROGRESS NOTES
Other Topics Concern    Not on file   Social History Narrative    Not on file     Social Determinants of Health     Financial Resource Strain: Not on file   Food Insecurity: Not on file   Transportation Needs: Not on file   Physical Activity: Not on file   Stress: Not on file   Social Connections: Not on file   Intimate Partner Violence: Not on file   Housing Stability: Not on file         No results found for: \"LABA1C\"  No results found for: \"EAG\"    Lab Results   Component Value Date/Time     09/15/2023 07:19 AM    K 4.5 09/15/2023 07:19 AM     09/15/2023 07:19 AM    CO2 24 09/15/2023 07:19 AM    BUN 35 09/15/2023 07:19 AM    CREATININE 0.80 09/15/2023 07:19 AM    GLUCOSE 96 09/15/2023 07:19 AM    GLUCOSE 171 09/03/2020 07:40 PM    CALCIUM 9.4 09/15/2023 07:19 AM        Lab Results   Component Value Date    CHOL 125 09/15/2023    CHOL 154 01/21/2021    CHOL 147 08/08/2017     Lab Results   Component Value Date    TRIG 167 (H) 09/15/2023    TRIG 234 (H) 01/21/2021    TRIG 99 08/08/2017     Lab Results   Component Value Date    HDL 40 09/15/2023    HDL 40 01/21/2021    HDL 67 (H) 08/08/2017     Lab Results   Component Value Date    LDLCALC 52 09/15/2023    LDLCALC 67 01/21/2021    [facility] 60 08/08/2017     No results found for: \"LABVLDL\", \"VLDL\"  No results found for: \"CHOLHDLRATIO\"    Lab Results   Component Value Date    TSH 0.338 (L) 09/15/2023       Lab Results   Component Value Date    WBC 6.0 09/15/2023    HGB 12.0 09/15/2023    HCT 35.9 (L) 09/15/2023    MCV 94.1 09/15/2023     09/15/2023       Please note orders entered on site at facility after discussion with appropriate facility nursing/therapy/ / nutritional staff. Current longstanding medical problems and acute medical issues addressed with staff. Available data and data elements in on site paper chart reviewed and analyzed. Current external consultant notes reviewed in on site chart.  Ordered laboratory testing and

## 2023-11-20 ENCOUNTER — OFFICE VISIT (OUTPATIENT)
Dept: GERIATRIC MEDICINE | Age: 88
End: 2023-11-20

## 2023-11-20 DIAGNOSIS — I10 PRIMARY HYPERTENSION: ICD-10-CM

## 2023-11-20 DIAGNOSIS — G30.1 LATE ONSET ALZHEIMER'S DISEASE WITHOUT BEHAVIORAL DISTURBANCE (HCC): Primary | ICD-10-CM

## 2023-11-20 DIAGNOSIS — F02.80 LATE ONSET ALZHEIMER'S DISEASE WITHOUT BEHAVIORAL DISTURBANCE (HCC): Primary | ICD-10-CM

## 2023-11-20 DIAGNOSIS — J06.9 UPPER RESPIRATORY TRACT INFECTION, UNSPECIFIED TYPE: ICD-10-CM

## 2023-12-08 NOTE — PROGRESS NOTES
phrases that may seem inaccurate.   For any questions or concerns feel free to contact me for clarification     THAO Merrill - CNP

## 2024-01-15 ENCOUNTER — OFFICE VISIT (OUTPATIENT)
Dept: GERIATRIC MEDICINE | Age: 89
End: 2024-01-15
Payer: MEDICARE

## 2024-01-15 DIAGNOSIS — J06.9 UPPER RESPIRATORY TRACT INFECTION, UNSPECIFIED TYPE: Primary | ICD-10-CM

## 2024-01-15 LAB — RSV BY PCR: POSITIVE

## 2024-01-15 PROCEDURE — 1123F ACP DISCUSS/DSCN MKR DOCD: CPT | Performed by: NURSE PRACTITIONER

## 2024-01-15 PROCEDURE — 99309 SBSQ NF CARE MODERATE MDM 30: CPT | Performed by: NURSE PRACTITIONER

## 2024-01-18 ENCOUNTER — OFFICE VISIT (OUTPATIENT)
Dept: GERIATRIC MEDICINE | Age: 89
End: 2024-01-18

## 2024-01-18 DIAGNOSIS — J12.1: Primary | ICD-10-CM

## 2024-02-05 PROBLEM — J06.9 UPPER RESPIRATORY TRACT INFECTION: Status: ACTIVE | Noted: 2024-02-05

## 2024-02-06 NOTE — PROGRESS NOTES
[facility]  1/15/2024    Meghann Pisano  is a 89 y.o. in the  being seen for a acute visit for   Chief Complaint   Patient presents with    URI       HPI Patient is being seen today for an acute visit for URI. They are eating and drinking at their baseline per nursing. They have had no recent falls with injuries. They are not complaining of any un addressed pain issues. Have had no recent choking or dysphagia issues. NO agitation or unusual mental behavioral issues.     Past Medical History:   Diagnosis Date    Alzheimer's dementia     Anxiety     CAD (coronary artery disease)     Dementia     Hyperlipidemia     Hypertension     Hypothyroidism     Melanoma (HCC)     skin    Osteoarthritis      Past Surgical History:   Procedure Laterality Date    BACK SURGERY      BREAST SURGERY      biopsy    CORONARY ANGIOPLASTY WITH STENT PLACEMENT      HYSTERECTOMY  1960     Family History   Problem Relation Age of Onset    Cancer Brother      Social History     Socioeconomic History    Marital status:      Spouse name: Not on file    Number of children: Not on file    Years of education: Not on file    Highest education level: Not on file   Occupational History    Not on file   Tobacco Use    Smoking status: Never    Smokeless tobacco: Never   Substance and Sexual Activity    Alcohol use: No    Drug use: No    Sexual activity: Not on file   Other Topics Concern    Not on file   Social History Narrative    Not on file     Social Determinants of Health     Financial Resource Strain: Not on file   Food Insecurity: Not on file   Transportation Needs: Not on file   Physical Activity: Not on file   Stress: Not on file   Social Connections: Not on file   Intimate Partner Violence: Not on file   Housing Stability: Not on file       Allergies: Amoxil [amoxicillin], Calcium citrate, Noroxin [norfloxacin], and Tiazac [diltiazem]  NF MEDICATIONS REVIEWED    ROS:   Constitutional: There are no reports of

## 2024-02-08 PROBLEM — J12.1: Status: ACTIVE | Noted: 2024-02-08

## 2024-02-08 NOTE — PROGRESS NOTES
no reports of behavioral issues, change in appetite, fever, or increased weakness. No bleeding issues.  Respiratory: denies SOB, dyspnea, dyspnea on exertion  Cardiovascular: denies CP, lightheadedness, palpitations, PND, orthopnea, or claudication.  GI: No N/V/D.   : no reports of dysuria, frequency or urgency  Extremities: No reports of pain issues, edema  Psych: at baseline confusion     Physical exam:   /72, temperature 97.8, heart rate 66, respirations 18  Constitutional: Awake and alert sitting up in bed, in no apparent distress, general weakness  HEENT: Normocephalic, [place],intact facial symmetry, conjunctiva pink, sclera non icteric,  buccal mucosa pink and moist  Speech clear.   NECK: - no cervical or clavicular lymphadenopathy, euthyroid, no mass visualized  Cardiovascular: Regular rate, and rhythm. No murmurs, gallops or rubs noted.  Respiratory: LCTA, even unlabored respirations, no accessory muscle use noted, moist NP cough  GI: abdomen NT, +BS x 4 Q, ND  : no suprapubic tenderness  Extremities: no ankle edema, PPP  SKELETAL: no redness, or swelling over joints. Limited ROM but spontaneous movement x 4   Psych: pleasantly confused, repetitive   SKIN: no gross skin lesions noted on visualized skin, warm and dry    ASSESSMENT:     Diagnosis Orders   1. Pneumonia, respiratory syncytial virus            PLAN:  Pt/POA agrees with POC   CXR + for PNA  Levaquin 750mg po QD x5 days  Acidophilus one tab BID x9 days  Robitussin PRN  Return in about 1 month (around 2/18/2024), or if symptoms worsen or fail to improve, for chronic conditions.  30 minutes spent on visit  Please note this report is partially produced by using speech recognition hardware.  It may contain errors related to the system, including grammar, punctuation and spelling as well as words and phrases that may seem inaccurate.  For any questions or concerns feel free to contact me for clarification       Dorcas Taylor, THAO - CNP

## 2024-03-06 ENCOUNTER — OFFICE VISIT (OUTPATIENT)
Dept: GERIATRIC MEDICINE | Age: 89
End: 2024-03-06

## 2024-03-06 DIAGNOSIS — E46 PROTEIN-CALORIE MALNUTRITION, UNSPECIFIED SEVERITY (HCC): ICD-10-CM

## 2024-03-06 DIAGNOSIS — I25.10 CORONARY ARTERY DISEASE INVOLVING NATIVE CORONARY ARTERY OF NATIVE HEART WITHOUT ANGINA PECTORIS: ICD-10-CM

## 2024-03-06 DIAGNOSIS — I10 PRIMARY HYPERTENSION: Primary | ICD-10-CM

## 2024-03-14 LAB
ANION GAP SERPL CALCULATED.3IONS-SCNC: 10 MEQ/L (ref 9–15)
BASOPHILS # BLD: 0 K/UL (ref 0–0.2)
BASOPHILS NFR BLD: 0.5 %
BUN SERPL-MCNC: 33 MG/DL (ref 8–23)
CALCIUM SERPL-MCNC: 9.2 MG/DL (ref 8.5–9.9)
CHLORIDE SERPL-SCNC: 106 MEQ/L (ref 95–107)
CO2 SERPL-SCNC: 25 MEQ/L (ref 20–31)
CREAT SERPL-MCNC: 0.97 MG/DL (ref 0.5–0.9)
EOSINOPHIL # BLD: 0.2 K/UL (ref 0–0.7)
EOSINOPHIL NFR BLD: 4 %
ERYTHROCYTE [DISTWIDTH] IN BLOOD BY AUTOMATED COUNT: 15.9 % (ref 11.5–14.5)
GLUCOSE SERPL-MCNC: 87 MG/DL (ref 70–99)
HCT VFR BLD AUTO: 36.4 % (ref 37–47)
HGB BLD-MCNC: 11.2 G/DL (ref 12–16)
LYMPHOCYTES # BLD: 2.8 K/UL (ref 1–4.8)
LYMPHOCYTES NFR BLD: 48.3 %
MCH RBC QN AUTO: 29.7 PG (ref 27–31.3)
MCHC RBC AUTO-ENTMCNC: 30.8 % (ref 33–37)
MCV RBC AUTO: 96.6 FL (ref 79.4–94.8)
MONOCYTES # BLD: 0.4 K/UL (ref 0.2–0.8)
MONOCYTES NFR BLD: 7.6 %
NEUTROPHILS # BLD: 2.3 K/UL (ref 1.4–6.5)
NEUTS SEG NFR BLD: 39.4 %
PLATELET # BLD AUTO: 198 K/UL (ref 130–400)
POTASSIUM SERPL-SCNC: 3.8 MEQ/L (ref 3.4–4.9)
RBC # BLD AUTO: 3.77 M/UL (ref 4.2–5.4)
SODIUM SERPL-SCNC: 141 MEQ/L (ref 135–144)
TSH SERPL-MCNC: 0.87 UIU/ML (ref 0.44–3.86)
VITAMIN D 25-HYDROXY: 46.5 NG/ML (ref 30–100)
WBC # BLD AUTO: 5.8 K/UL (ref 4.8–10.8)

## 2024-03-21 PROBLEM — E46 PROTEIN-CALORIE MALNUTRITION (HCC): Status: ACTIVE | Noted: 2024-03-21

## 2024-03-22 NOTE — PROGRESS NOTES
[facility]  3/6/2024    Meghann Pisano  is a 89 y.o. in the NF being seen for a f/u of   Chief Complaint   Patient presents with    1 Month Follow-Up       HPI lives in LTC  Being seen monthly for chronic illnesses.     Past Medical History:   Diagnosis Date    Alzheimer's dementia     Anxiety     CAD (coronary artery disease)     Dementia     Hyperlipidemia     Hypertension     Hypothyroidism     Melanoma (HCC)     skin    Osteoarthritis      Past Surgical History:   Procedure Laterality Date    BACK SURGERY      BREAST SURGERY      biopsy    CORONARY ANGIOPLASTY WITH STENT PLACEMENT      HYSTERECTOMY  1960     Family History   Problem Relation Age of Onset    Cancer Brother      Social History     Socioeconomic History    Marital status:      Spouse name: Not on file    Number of children: Not on file    Years of education: Not on file    Highest education level: Not on file   Occupational History    Not on file   Tobacco Use    Smoking status: Never    Smokeless tobacco: Never   Substance and Sexual Activity    Alcohol use: No    Drug use: No    Sexual activity: Not on file   Other Topics Concern    Not on file   Social History Narrative    Not on file     Social Determinants of Health     Financial Resource Strain: Not on file   Food Insecurity: Not on file   Transportation Needs: Not on file   Physical Activity: Not on file   Stress: Not on file   Social Connections: Not on file   Intimate Partner Violence: Not on file   Housing Stability: Not on file       Allergies: Amoxil [amoxicillin], Calcium citrate, Noroxin [norfloxacin], and Tiazac [diltiazem]  NF MEDICATIONS REVIEWED    ROS:  See HPI  Constitutional: There are no reports of behavioral issues, change in appetite, fever, or weakness. No gross bleeding issues.  Respiratory: no reports of SOB, dyspnea, dyspnea on exertion,   Cardiovascular: no reports of CP, lightheadedness,   GI: No reports of change of bowel habits, no N/V/D/C.

## 2024-04-10 LAB
ANION GAP SERPL CALCULATED.3IONS-SCNC: 13 MEQ/L (ref 9–15)
BASOPHILS # BLD: 0 K/UL (ref 0–0.2)
BASOPHILS NFR BLD: 0.6 %
BUN SERPL-MCNC: 25 MG/DL (ref 8–23)
CALCIUM SERPL-MCNC: 9.3 MG/DL (ref 8.5–9.9)
CHLORIDE SERPL-SCNC: 108 MEQ/L (ref 95–107)
CO2 SERPL-SCNC: 24 MEQ/L (ref 20–31)
CREAT SERPL-MCNC: 0.75 MG/DL (ref 0.5–0.9)
EOSINOPHIL # BLD: 0.2 K/UL (ref 0–0.7)
EOSINOPHIL NFR BLD: 3.9 %
ERYTHROCYTE [DISTWIDTH] IN BLOOD BY AUTOMATED COUNT: 14.3 % (ref 11.5–14.5)
GLUCOSE SERPL-MCNC: 85 MG/DL (ref 70–99)
HCT VFR BLD AUTO: 36.4 % (ref 37–47)
HGB BLD-MCNC: 11.6 G/DL (ref 12–16)
LYMPHOCYTES # BLD: 2.7 K/UL (ref 1–4.8)
LYMPHOCYTES NFR BLD: 50.4 %
MCH RBC QN AUTO: 30.6 PG (ref 27–31.3)
MCHC RBC AUTO-ENTMCNC: 31.9 % (ref 33–37)
MCV RBC AUTO: 96 FL (ref 79.4–94.8)
MONOCYTES # BLD: 0.5 K/UL (ref 0.2–0.8)
MONOCYTES NFR BLD: 8.6 %
NEUTROPHILS # BLD: 2 K/UL (ref 1.4–6.5)
NEUTS SEG NFR BLD: 36.3 %
PLATELET # BLD AUTO: 201 K/UL (ref 130–400)
POTASSIUM SERPL-SCNC: 4.7 MEQ/L (ref 3.4–4.9)
RBC # BLD AUTO: 3.79 M/UL (ref 4.2–5.4)
SODIUM SERPL-SCNC: 145 MEQ/L (ref 135–144)
WBC # BLD AUTO: 5.4 K/UL (ref 4.8–10.8)

## 2024-05-16 ENCOUNTER — OFFICE VISIT (OUTPATIENT)
Dept: GERIATRIC MEDICINE | Age: 89
End: 2024-05-16

## 2024-05-16 DIAGNOSIS — F02.80 LATE ONSET ALZHEIMER'S DISEASE WITHOUT BEHAVIORAL DISTURBANCE (HCC): Primary | ICD-10-CM

## 2024-05-16 DIAGNOSIS — I25.10 CORONARY ARTERY DISEASE INVOLVING NATIVE CORONARY ARTERY OF NATIVE HEART WITHOUT ANGINA PECTORIS: ICD-10-CM

## 2024-05-16 DIAGNOSIS — I10 PRIMARY HYPERTENSION: ICD-10-CM

## 2024-05-16 DIAGNOSIS — G30.1 LATE ONSET ALZHEIMER'S DISEASE WITHOUT BEHAVIORAL DISTURBANCE (HCC): Primary | ICD-10-CM

## 2024-05-29 ENCOUNTER — OFFICE VISIT (OUTPATIENT)
Dept: GERIATRIC MEDICINE | Age: 89
End: 2024-05-29

## 2024-05-29 DIAGNOSIS — E46 PROTEIN-CALORIE MALNUTRITION, UNSPECIFIED SEVERITY (HCC): ICD-10-CM

## 2024-05-29 DIAGNOSIS — G30.1 LATE ONSET ALZHEIMER'S DISEASE WITHOUT BEHAVIORAL DISTURBANCE (HCC): Primary | ICD-10-CM

## 2024-05-29 DIAGNOSIS — E03.9 HYPOTHYROIDISM, UNSPECIFIED TYPE: ICD-10-CM

## 2024-05-29 DIAGNOSIS — F02.80 LATE ONSET ALZHEIMER'S DISEASE WITHOUT BEHAVIORAL DISTURBANCE (HCC): Primary | ICD-10-CM

## 2024-06-12 LAB
ANION GAP SERPL CALCULATED.3IONS-SCNC: 10 MEQ/L (ref 9–15)
BASOPHILS # BLD: 0.1 K/UL (ref 0–0.2)
BASOPHILS NFR BLD: 0.8 %
BUN SERPL-MCNC: 31 MG/DL (ref 8–23)
CALCIUM SERPL-MCNC: 9.6 MG/DL (ref 8.5–9.9)
CHLORIDE SERPL-SCNC: 105 MEQ/L (ref 95–107)
CO2 SERPL-SCNC: 27 MEQ/L (ref 20–31)
CREAT SERPL-MCNC: 0.79 MG/DL (ref 0.5–0.9)
EOSINOPHIL # BLD: 0.3 K/UL (ref 0–0.7)
EOSINOPHIL NFR BLD: 3.8 %
ERYTHROCYTE [DISTWIDTH] IN BLOOD BY AUTOMATED COUNT: 14 % (ref 11.5–14.5)
GLUCOSE SERPL-MCNC: 104 MG/DL (ref 70–99)
HCT VFR BLD AUTO: 40.2 % (ref 37–47)
HGB BLD-MCNC: 12.7 G/DL (ref 12–16)
LYMPHOCYTES # BLD: 2.8 K/UL (ref 1–4.8)
LYMPHOCYTES NFR BLD: 42.2 %
MCH RBC QN AUTO: 29.7 PG (ref 27–31.3)
MCHC RBC AUTO-ENTMCNC: 31.6 % (ref 33–37)
MCV RBC AUTO: 94.1 FL (ref 79.4–94.8)
MONOCYTES # BLD: 0.5 K/UL (ref 0.2–0.8)
MONOCYTES NFR BLD: 8 %
NEUTROPHILS # BLD: 3 K/UL (ref 1.4–6.5)
NEUTS SEG NFR BLD: 45 %
PLATELET # BLD AUTO: 243 K/UL (ref 130–400)
POTASSIUM SERPL-SCNC: 4.3 MEQ/L (ref 3.4–4.9)
RBC # BLD AUTO: 4.27 M/UL (ref 4.2–5.4)
SODIUM SERPL-SCNC: 142 MEQ/L (ref 135–144)
WBC # BLD AUTO: 6.6 K/UL (ref 4.8–10.8)

## 2024-06-14 ENCOUNTER — OFFICE VISIT (OUTPATIENT)
Dept: GERIATRIC MEDICINE | Age: 89
End: 2024-06-14

## 2024-06-14 DIAGNOSIS — G30.1 LATE ONSET ALZHEIMER'S DISEASE WITHOUT BEHAVIORAL DISTURBANCE (HCC): ICD-10-CM

## 2024-06-14 DIAGNOSIS — F02.80 LATE ONSET ALZHEIMER'S DISEASE WITHOUT BEHAVIORAL DISTURBANCE (HCC): ICD-10-CM

## 2024-06-14 DIAGNOSIS — I10 PRIMARY HYPERTENSION: Primary | ICD-10-CM

## 2024-06-14 DIAGNOSIS — E03.9 HYPOTHYROIDISM, UNSPECIFIED TYPE: ICD-10-CM

## 2024-06-14 NOTE — PROGRESS NOTES
[facility]  5/16/2024    Meghann Pisano  is a 89 y.o. in the NF being seen for a f/u of   Chief Complaint   Patient presents with    1 Month Follow-Up       HPI lives in LTC  Being seen monthly for chronic illnesses.     Past Medical History:   Diagnosis Date    Alzheimer's dementia     Anxiety     CAD (coronary artery disease)     Dementia     Hyperlipidemia     Hypertension     Hypothyroidism     Melanoma (HCC)     skin    Osteoarthritis      Past Surgical History:   Procedure Laterality Date    BACK SURGERY      BREAST SURGERY      biopsy    CORONARY ANGIOPLASTY WITH STENT PLACEMENT      HYSTERECTOMY  1960     Family History   Problem Relation Age of Onset    Cancer Brother      Social History     Socioeconomic History    Marital status:      Spouse name: Not on file    Number of children: Not on file    Years of education: Not on file    Highest education level: Not on file   Occupational History    Not on file   Tobacco Use    Smoking status: Never    Smokeless tobacco: Never   Substance and Sexual Activity    Alcohol use: No    Drug use: No    Sexual activity: Not on file   Other Topics Concern    Not on file   Social History Narrative    Not on file     Social Determinants of Health     Financial Resource Strain: Not on file   Food Insecurity: Not on file   Transportation Needs: Not on file   Physical Activity: Not on file   Stress: Not on file   Social Connections: Not on file   Intimate Partner Violence: Not on file   Housing Stability: Not on file       Allergies: Amoxil [amoxicillin], Calcium citrate, Noroxin [norfloxacin], and Tiazac [diltiazem]  NF MEDICATIONS REVIEWED    ROS:  See HPI  Constitutional: There are no reports of behavioral issues, change in appetite, fever, or weakness. No gross bleeding issues.  Respiratory: denies SOB, dyspnea, dyspnea on exertion,   Cardiovascular: denies CP, lightheadedness,   GI: No reports of change of bowel habits, no N/V/D/C.   : no

## 2024-07-17 ENCOUNTER — OFFICE VISIT (OUTPATIENT)
Dept: GERIATRIC MEDICINE | Age: 89
End: 2024-07-17
Payer: MEDICARE

## 2024-07-17 DIAGNOSIS — R63.4 ABNORMAL WEIGHT LOSS: Primary | ICD-10-CM

## 2024-07-17 PROCEDURE — 1123F ACP DISCUSS/DSCN MKR DOCD: CPT | Performed by: NURSE PRACTITIONER

## 2024-07-17 PROCEDURE — 99309 SBSQ NF CARE MODERATE MDM 30: CPT | Performed by: NURSE PRACTITIONER

## 2024-07-25 ENCOUNTER — OFFICE VISIT (OUTPATIENT)
Dept: GERIATRIC MEDICINE | Age: 89
End: 2024-07-25
Payer: MEDICARE

## 2024-07-25 DIAGNOSIS — R53.1 WEAKNESS: ICD-10-CM

## 2024-07-25 DIAGNOSIS — I10 PRIMARY HYPERTENSION: ICD-10-CM

## 2024-07-25 DIAGNOSIS — M15.9 OSTEOARTHRITIS OF MULTIPLE JOINTS, UNSPECIFIED OSTEOARTHRITIS TYPE: ICD-10-CM

## 2024-07-25 DIAGNOSIS — F02.80 LATE ONSET ALZHEIMER'S DISEASE WITHOUT BEHAVIORAL DISTURBANCE (HCC): Primary | ICD-10-CM

## 2024-07-25 DIAGNOSIS — I25.10 CORONARY ARTERY DISEASE INVOLVING NATIVE CORONARY ARTERY OF NATIVE HEART WITHOUT ANGINA PECTORIS: ICD-10-CM

## 2024-07-25 DIAGNOSIS — E46 PROTEIN-CALORIE MALNUTRITION, UNSPECIFIED SEVERITY (HCC): ICD-10-CM

## 2024-07-25 DIAGNOSIS — F41.1 GENERALIZED ANXIETY DISORDER: ICD-10-CM

## 2024-07-25 DIAGNOSIS — G30.1 LATE ONSET ALZHEIMER'S DISEASE WITHOUT BEHAVIORAL DISTURBANCE (HCC): Primary | ICD-10-CM

## 2024-07-25 DIAGNOSIS — E03.9 HYPOTHYROIDISM, UNSPECIFIED TYPE: ICD-10-CM

## 2024-07-25 DIAGNOSIS — E55.9 VITAMIN D DEFICIENCY: ICD-10-CM

## 2024-07-25 PROCEDURE — 99310 SBSQ NF CARE HIGH MDM 45: CPT | Performed by: NURSE PRACTITIONER

## 2024-07-25 PROCEDURE — 1123F ACP DISCUSS/DSCN MKR DOCD: CPT | Performed by: NURSE PRACTITIONER

## 2024-07-25 PROCEDURE — G0317 PR PROLONG NURSIN FAC EVAL 15M: HCPCS | Performed by: NURSE PRACTITIONER

## 2024-07-29 LAB
ALBUMIN SERPL-MCNC: 4.1 G/DL (ref 3.5–4.6)
ALP SERPL-CCNC: 87 U/L (ref 40–130)
ALT SERPL-CCNC: 16 U/L (ref 0–33)
ANION GAP SERPL CALCULATED.3IONS-SCNC: 15 MEQ/L (ref 9–15)
AST SERPL-CCNC: 22 U/L (ref 0–35)
BASOPHILS # BLD: 0 K/UL (ref 0–0.2)
BASOPHILS NFR BLD: 0.4 %
BILIRUB SERPL-MCNC: 0.5 MG/DL (ref 0.2–0.7)
BUN SERPL-MCNC: 28 MG/DL (ref 8–23)
CALCIUM SERPL-MCNC: 9.5 MG/DL (ref 8.5–9.9)
CHLORIDE SERPL-SCNC: 103 MEQ/L (ref 95–107)
CO2 SERPL-SCNC: 24 MEQ/L (ref 20–31)
CREAT SERPL-MCNC: 0.73 MG/DL (ref 0.5–0.9)
CRP SERPL HS-MCNC: <3 MG/L (ref 0–5)
EOSINOPHIL # BLD: 0.2 K/UL (ref 0–0.7)
EOSINOPHIL NFR BLD: 2 %
ERYTHROCYTE [DISTWIDTH] IN BLOOD BY AUTOMATED COUNT: 13.9 % (ref 11.5–14.5)
GLOBULIN SER CALC-MCNC: 2.8 G/DL (ref 2.3–3.5)
GLUCOSE SERPL-MCNC: 105 MG/DL (ref 70–99)
HCT VFR BLD AUTO: 40.2 % (ref 37–47)
HGB BLD-MCNC: 13 G/DL (ref 12–16)
LYMPHOCYTES # BLD: 1.9 K/UL (ref 1–4.8)
LYMPHOCYTES NFR BLD: 25.6 %
MCH RBC QN AUTO: 30.2 PG (ref 27–31.3)
MCHC RBC AUTO-ENTMCNC: 32.3 % (ref 33–37)
MCV RBC AUTO: 93.3 FL (ref 79.4–94.8)
MONOCYTES # BLD: 0.5 K/UL (ref 0.2–0.8)
MONOCYTES NFR BLD: 6.8 %
NEUTROPHILS # BLD: 4.9 K/UL (ref 1.4–6.5)
NEUTS SEG NFR BLD: 64.8 %
PLATELET # BLD AUTO: 238 K/UL (ref 130–400)
POTASSIUM SERPL-SCNC: 4.5 MEQ/L (ref 3.4–4.9)
PROT SERPL-MCNC: 6.9 G/DL (ref 6.3–8)
RBC # BLD AUTO: 4.31 M/UL (ref 4.2–5.4)
SODIUM SERPL-SCNC: 142 MEQ/L (ref 135–144)
TSH SERPL-MCNC: 0.6 UIU/ML (ref 0.44–3.86)
WBC # BLD AUTO: 7.5 K/UL (ref 4.8–10.8)

## 2024-07-30 PROBLEM — R63.4 ABNORMAL WEIGHT LOSS: Status: ACTIVE | Noted: 2024-07-30

## 2024-07-30 NOTE — PROGRESS NOTES
[facility]  7/17/2024    Meghann Pisano  is a 89 y.o. in the  being seen for a acute visit for   Chief Complaint   Patient presents with    Weight Loss       HPI Patient being seen today for acute visit for abnormal weight loss. Patient noted to have experienced an estimated 10lb weight loss from April to May. No current weight available at the time of my visit. Nurse reports patient has been noted to be coughing with meals lately, which nurse believed was more a URI then a swallowing issue. They are eating and drinking at their baseline per nursing.  They are not complaining of any un addressed pain issues. Have had no recent choking or dysphagia issues. NO agitation or unusual mental behavioral issues.     Past Medical History:   Diagnosis Date    Alzheimer's dementia     Anxiety     CAD (coronary artery disease)     Dementia     Hyperlipidemia     Hypertension     Hypothyroidism     Melanoma (HCC)     skin    Osteoarthritis      Past Surgical History:   Procedure Laterality Date    BACK SURGERY      BREAST SURGERY      biopsy    CORONARY ANGIOPLASTY WITH STENT PLACEMENT      HYSTERECTOMY  1960     Family History   Problem Relation Age of Onset    Cancer Brother      Social History     Socioeconomic History    Marital status:      Spouse name: Not on file    Number of children: Not on file    Years of education: Not on file    Highest education level: Not on file   Occupational History    Not on file   Tobacco Use    Smoking status: Never    Smokeless tobacco: Never   Substance and Sexual Activity    Alcohol use: No    Drug use: No    Sexual activity: Not on file   Other Topics Concern    Not on file   Social History Narrative    Not on file     Social Determinants of Health     Financial Resource Strain: Not on file   Food Insecurity: Not on file   Transportation Needs: Not on file   Physical Activity: Not on file   Stress: Not on file   Social Connections: Not on file   Intimate

## 2024-07-31 NOTE — PROGRESS NOTES
[facility] [place] [id number] [place] [id number]  P: [phone]      Subjective:  Annual H & P    7/25/2024    Patient ID: Meghann Pisano is a 89 y.o. female being seen today for:   Chief Complaint   Patient presents with    Annual Exam       HPI Patient was admitted to facility 2/2020 for long term care. Patient is currently a DNC code status.    Past Medical History:   Diagnosis Date    Alzheimer's dementia     Anxiety     CAD (coronary artery disease)     Dementia     Hyperlipidemia     Hypertension     Hypothyroidism     Melanoma (HCC)     skin    Osteoarthritis      Past Surgical History:   Procedure Laterality Date    BACK SURGERY      BREAST SURGERY      biopsy    CORONARY ANGIOPLASTY WITH STENT PLACEMENT      HYSTERECTOMY  1960     Family History   Problem Relation Age of Onset    Cancer Brother      Social History     Socioeconomic History    Marital status:      Spouse name: Not on file    Number of children: Not on file    Years of education: Not on file    Highest education level: Not on file   Occupational History    Not on file   Tobacco Use    Smoking status: Never    Smokeless tobacco: Never   Substance and Sexual Activity    Alcohol use: No    Drug use: No    Sexual activity: Not on file   Other Topics Concern    Not on file   Social History Narrative    Not on file     Social Determinants of Health     Financial Resource Strain: Not on file   Food Insecurity: Not on file   Transportation Needs: Not on file   Physical Activity: Not on file   Stress: Not on file   Social Connections: Not on file   Intimate Partner Violence: Not on file   Housing Stability: Not on file       Allergies: Amoxil [amoxicillin], Calcium citrate, Noroxin [norfloxacin], and Tiazac [diltiazem]  Current Outpatient Medications on File Prior to Visit   Medication Sig Dispense Refill    loperamide (IMODIUM A-D) 2 MG tablet Take 1 tablet by mouth 4 times daily as needed for Diarrhea      LORazepam (ATIVAN) 0.5 MG

## 2024-08-20 ENCOUNTER — OFFICE VISIT (OUTPATIENT)
Dept: GERIATRIC MEDICINE | Age: 89
End: 2024-08-20

## 2024-08-20 DIAGNOSIS — I10 PRIMARY HYPERTENSION: ICD-10-CM

## 2024-08-20 DIAGNOSIS — G30.1 LATE ONSET ALZHEIMER'S DISEASE WITHOUT BEHAVIORAL DISTURBANCE (HCC): Primary | ICD-10-CM

## 2024-08-20 DIAGNOSIS — R13.10 DYSPHAGIA, UNSPECIFIED TYPE: ICD-10-CM

## 2024-08-20 DIAGNOSIS — F02.80 LATE ONSET ALZHEIMER'S DISEASE WITHOUT BEHAVIORAL DISTURBANCE (HCC): Primary | ICD-10-CM

## 2024-08-28 ENCOUNTER — OFFICE VISIT (OUTPATIENT)
Dept: GERIATRIC MEDICINE | Age: 89
End: 2024-08-28

## 2024-08-28 DIAGNOSIS — I10 PRIMARY HYPERTENSION: ICD-10-CM

## 2024-08-28 DIAGNOSIS — E03.9 HYPOTHYROIDISM, UNSPECIFIED TYPE: Primary | ICD-10-CM

## 2024-08-28 DIAGNOSIS — G30.1 LATE ONSET ALZHEIMER'S DISEASE WITHOUT BEHAVIORAL DISTURBANCE (HCC): ICD-10-CM

## 2024-08-28 DIAGNOSIS — F02.80 LATE ONSET ALZHEIMER'S DISEASE WITHOUT BEHAVIORAL DISTURBANCE (HCC): ICD-10-CM

## 2024-09-19 PROBLEM — R13.10 DYSPHAGIA: Status: ACTIVE | Noted: 2024-09-19

## 2024-10-15 ENCOUNTER — OFFICE VISIT (OUTPATIENT)
Dept: GERIATRIC MEDICINE | Age: 89
End: 2024-10-15

## 2024-10-15 DIAGNOSIS — I10 PRIMARY HYPERTENSION: ICD-10-CM

## 2024-10-15 DIAGNOSIS — I25.10 CORONARY ARTERY DISEASE INVOLVING NATIVE CORONARY ARTERY OF NATIVE HEART WITHOUT ANGINA PECTORIS: Primary | ICD-10-CM

## 2024-10-15 DIAGNOSIS — E03.9 HYPOTHYROIDISM, UNSPECIFIED TYPE: ICD-10-CM

## 2024-11-01 ENCOUNTER — OFFICE VISIT (OUTPATIENT)
Dept: GERIATRIC MEDICINE | Age: 88
End: 2024-11-01

## 2024-11-01 DIAGNOSIS — F02.80 LATE ONSET ALZHEIMER'S DISEASE WITHOUT BEHAVIORAL DISTURBANCE (HCC): Primary | ICD-10-CM

## 2024-11-01 DIAGNOSIS — E03.9 HYPOTHYROIDISM, UNSPECIFIED TYPE: ICD-10-CM

## 2024-11-01 DIAGNOSIS — G30.1 LATE ONSET ALZHEIMER'S DISEASE WITHOUT BEHAVIORAL DISTURBANCE (HCC): Primary | ICD-10-CM

## 2024-11-01 DIAGNOSIS — E46 PROTEIN-CALORIE MALNUTRITION, UNSPECIFIED SEVERITY (HCC): ICD-10-CM

## 2024-12-09 ENCOUNTER — OFFICE VISIT (OUTPATIENT)
Dept: GERIATRIC MEDICINE | Age: 88
End: 2024-12-09

## 2024-12-09 DIAGNOSIS — F02.80 LATE ONSET ALZHEIMER'S DISEASE WITHOUT BEHAVIORAL DISTURBANCE (HCC): Primary | ICD-10-CM

## 2024-12-09 DIAGNOSIS — G30.1 LATE ONSET ALZHEIMER'S DISEASE WITHOUT BEHAVIORAL DISTURBANCE (HCC): Primary | ICD-10-CM

## 2024-12-09 DIAGNOSIS — I25.10 CORONARY ARTERY DISEASE INVOLVING NATIVE CORONARY ARTERY OF NATIVE HEART WITHOUT ANGINA PECTORIS: ICD-10-CM

## 2024-12-09 DIAGNOSIS — D64.9 ANEMIA, UNSPECIFIED TYPE: ICD-10-CM

## 2024-12-09 DIAGNOSIS — E03.9 HYPOTHYROIDISM, UNSPECIFIED TYPE: ICD-10-CM

## 2024-12-30 NOTE — PROGRESS NOTES
[facility]  12/9/2024    Mehgann Pisano  is a 89 y.o. in the NF being seen for a f/u of   Chief Complaint   Patient presents with    1 Month Follow-Up       HPI lives in LTC  Being seen monthly for chronic illnesses.     Past Medical History:   Diagnosis Date    Alzheimer's dementia     Anxiety     CAD (coronary artery disease)     Dementia     Hyperlipidemia     Hypertension     Hypothyroidism     Melanoma (HCC)     skin    Osteoarthritis      Past Surgical History:   Procedure Laterality Date    BACK SURGERY      BREAST SURGERY      biopsy    CORONARY ANGIOPLASTY WITH STENT PLACEMENT      HYSTERECTOMY  1960     Family History   Problem Relation Age of Onset    Cancer Brother      Social History     Socioeconomic History    Marital status:      Spouse name: Not on file    Number of children: Not on file    Years of education: Not on file    Highest education level: Not on file   Occupational History    Not on file   Tobacco Use    Smoking status: Never    Smokeless tobacco: Never   Substance and Sexual Activity    Alcohol use: No    Drug use: No    Sexual activity: Not on file   Other Topics Concern    Not on file   Social History Narrative    Not on file     Social Determinants of Health     Financial Resource Strain: Not on file   Food Insecurity: Not on file   Transportation Needs: Not on file   Physical Activity: Not on file   Stress: Not on file   Social Connections: Not on file   Intimate Partner Violence: Not on file   Housing Stability: Not on file       Allergies: Amoxil [amoxicillin], Calcium citrate, Noroxin [norfloxacin], and Tiazac [diltiazem]  NF MEDICATIONS REVIEWED    ROS:  See HPI  Constitutional: There are no reports of behavioral issues, change in appetite, fever, or weakness. No gross bleeding issues.  Respiratory: denies SOB, dyspnea, dyspnea on exertion,   Cardiovascular: denies CP, lightheadedness,   GI: No reports of change of bowel habits, no N/V/D/C.   : no

## 2025-01-21 ENCOUNTER — OFFICE VISIT (OUTPATIENT)
Dept: GERIATRIC MEDICINE | Age: 89
End: 2025-01-21

## 2025-01-21 DIAGNOSIS — R13.10 DYSPHAGIA, UNSPECIFIED TYPE: ICD-10-CM

## 2025-01-21 DIAGNOSIS — E03.9 HYPOTHYROIDISM, UNSPECIFIED TYPE: Primary | ICD-10-CM

## 2025-01-21 DIAGNOSIS — G30.1 LATE ONSET ALZHEIMER'S DISEASE WITHOUT BEHAVIORAL DISTURBANCE (HCC): ICD-10-CM

## 2025-01-21 DIAGNOSIS — F02.80 LATE ONSET ALZHEIMER'S DISEASE WITHOUT BEHAVIORAL DISTURBANCE (HCC): ICD-10-CM

## 2025-02-07 ENCOUNTER — OFFICE VISIT (OUTPATIENT)
Dept: GERIATRIC MEDICINE | Age: 89
End: 2025-02-07

## 2025-02-07 DIAGNOSIS — E03.9 HYPOTHYROIDISM, UNSPECIFIED TYPE: Primary | ICD-10-CM

## 2025-02-07 DIAGNOSIS — G30.1 LATE ONSET ALZHEIMER'S DISEASE WITHOUT BEHAVIORAL DISTURBANCE (HCC): ICD-10-CM

## 2025-02-07 DIAGNOSIS — F02.80 LATE ONSET ALZHEIMER'S DISEASE WITHOUT BEHAVIORAL DISTURBANCE (HCC): ICD-10-CM

## 2025-02-07 DIAGNOSIS — I10 PRIMARY HYPERTENSION: ICD-10-CM

## 2025-02-20 NOTE — PROGRESS NOTES
SUBJECTIVE:  This 89-year-old woman seen in her room for follow-up visit for hypothyroidism dementia dysphagia no acute pill esophagitis patient has no chest palpitation has been coughing at times.      ROS: Limited by cognitive  The rest of the 14 point ROS negative    PHYSICAL EXAM: VSS per facility record  Pupils are small PERRL reactive oral mucosas dry chest-breath sounds cardiovascular showed a regular rate abdomen soft nontender    ASSESSMENT & PLAN:   Diagnosis Orders   1. Hypothyroidism, unspecified type        2. Late onset Alzheimer's disease without behavioral disturbance (HCC)        3. Dysphagia, unspecified type          No acute psychosis has been on Norvasc has been Calcitrol blocker has been on Bystolic no symptomatic bradycardia functional weak at this time.  Consider titration of donepezil is on Namenda tolerating well          Past Medical History:   Diagnosis Date    Alzheimer's dementia     Anxiety     CAD (coronary artery disease)     Dementia     Hyperlipidemia     Hypertension     Hypothyroidism     Melanoma (HCC)     skin    Osteoarthritis          Past Surgical History:   Procedure Laterality Date    BACK SURGERY      BREAST SURGERY      biopsy    CORONARY ANGIOPLASTY WITH STENT PLACEMENT      HYSTERECTOMY  1960         Current Outpatient Medications on File Prior to Visit   Medication Sig Dispense Refill    loperamide (IMODIUM A-D) 2 MG tablet Take 1 tablet by mouth 4 times daily as needed for Diarrhea      LORazepam (ATIVAN) 0.5 MG tablet Take 1 tablet by mouth daily as needed for Anxiety. Max Daily Amount: 0.5 mg      lisinopril (PRINIVIL;ZESTRIL) 40 MG tablet Take 0.5 tablets by mouth daily (Patient taking differently: Take 0.5 tablets by mouth daily Indications: High Blood Pressure Disorder) 30 tablet 3    amLODIPine (NORVASC) 5 MG tablet Take 1 tablet by mouth daily (Patient taking differently: Take 1 tablet by mouth daily Indications: High Blood Pressure Disorder) 30 tablet 3

## 2025-04-21 ENCOUNTER — OFFICE VISIT (OUTPATIENT)
Dept: GERIATRIC MEDICINE | Age: 89
End: 2025-04-21
Payer: MEDICARE

## 2025-04-21 DIAGNOSIS — E03.9 HYPOTHYROIDISM, UNSPECIFIED TYPE: ICD-10-CM

## 2025-04-21 DIAGNOSIS — I10 PRIMARY HYPERTENSION: Primary | ICD-10-CM

## 2025-04-21 DIAGNOSIS — F02.80 LATE ONSET ALZHEIMER'S DISEASE WITHOUT BEHAVIORAL DISTURBANCE (HCC): ICD-10-CM

## 2025-04-21 DIAGNOSIS — G30.1 LATE ONSET ALZHEIMER'S DISEASE WITHOUT BEHAVIORAL DISTURBANCE (HCC): ICD-10-CM

## 2025-04-21 PROCEDURE — 1123F ACP DISCUSS/DSCN MKR DOCD: CPT | Performed by: INTERNAL MEDICINE

## 2025-04-21 PROCEDURE — 99309 SBSQ NF CARE MODERATE MDM 30: CPT | Performed by: INTERNAL MEDICINE

## 2025-04-24 ENCOUNTER — OFFICE VISIT (OUTPATIENT)
Dept: GERIATRIC MEDICINE | Age: 89
End: 2025-04-24

## 2025-04-24 DIAGNOSIS — I10 PRIMARY HYPERTENSION: Primary | ICD-10-CM

## 2025-05-12 ENCOUNTER — OFFICE VISIT (OUTPATIENT)
Dept: GERIATRIC MEDICINE | Age: 89
End: 2025-05-12

## 2025-05-12 DIAGNOSIS — F02.80 LATE ONSET ALZHEIMER'S DISEASE WITHOUT BEHAVIORAL DISTURBANCE (HCC): ICD-10-CM

## 2025-05-12 DIAGNOSIS — G30.1 LATE ONSET ALZHEIMER'S DISEASE WITHOUT BEHAVIORAL DISTURBANCE (HCC): ICD-10-CM

## 2025-05-12 DIAGNOSIS — I10 HYPERTENSION, UNSPECIFIED TYPE: ICD-10-CM

## 2025-05-12 DIAGNOSIS — R13.10 DYSPHAGIA, UNSPECIFIED TYPE: Primary | ICD-10-CM

## 2025-05-21 NOTE — PROGRESS NOTES
activity: Not on file   Other Topics Concern    Not on file   Social History Narrative    Not on file     Social Drivers of Health     Financial Resource Strain: Not on file   Food Insecurity: Not on file   Transportation Needs: Not on file   Physical Activity: Not on file   Stress: Not on file   Social Connections: Not on file   Intimate Partner Violence: Not on file   Housing Stability: Not on file         No results found for: \"LABA1C\"  No results found for: \"EAG\"    Lab Results   Component Value Date/Time     07/29/2024 08:05 AM    K 4.5 07/29/2024 08:05 AM     07/29/2024 08:05 AM    CO2 24 07/29/2024 08:05 AM    BUN 28 07/29/2024 08:05 AM    CREATININE 0.73 07/29/2024 08:05 AM    GLUCOSE 105 07/29/2024 08:05 AM    GLUCOSE 171 09/03/2020 07:40 PM    CALCIUM 9.5 07/29/2024 08:05 AM        Lab Results   Component Value Date    CHOL 125 09/15/2023    CHOL 154 01/21/2021    CHOL 147 08/08/2017     Lab Results   Component Value Date    TRIG 167 (H) 09/15/2023    TRIG 234 (H) 01/21/2021    TRIG 99 08/08/2017     Lab Results   Component Value Date    HDL 40 09/15/2023    HDL 40 01/21/2021    HDL 67 (H) 08/08/2017     No components found for: \"LDLCHOLESTEROL\", \"LDLCALC\"  No results found for: \"VLDL\"  No results found for: \"CHOLHDLRATIO\"    Lab Results   Component Value Date    TSH 0.598 07/29/2024       Lab Results   Component Value Date    WBC 5.6 04/18/2025    HGB 10.8 (A) 04/18/2025    HCT 32.7 (A) 04/18/2025    MCV 93.1 04/18/2025     04/18/2025       Please note orders entered on site at facility after discussion with appropriate facility nursing/therapy/ / nutritional staff. Current longstanding medical problems and acute medical issues addressed with staff. Available data and data elements in on site paper chart reviewed and analyzed.  Current external consultant notes reviewed in on site chart. Ordered laboratory testing and imaging will be reviewed when available.

## 2025-05-24 NOTE — PROGRESS NOTES
SUBJECTIVE:        ROS:  The rest of the 14 point ROS negative    PHYSICAL EXAM: VSS per facility record      ASSESSMENT & PLAN:   Diagnosis Orders   1. Primary hypertension                      Past Medical History:   Diagnosis Date    Alzheimer's dementia     Anxiety     CAD (coronary artery disease)     Dementia     Hyperlipidemia     Hypertension     Hypothyroidism     Melanoma (HCC)     skin    Osteoarthritis          Past Surgical History:   Procedure Laterality Date    BACK SURGERY      BREAST SURGERY      biopsy    CORONARY ANGIOPLASTY WITH STENT PLACEMENT      HYSTERECTOMY  1960         Current Outpatient Medications on File Prior to Visit   Medication Sig Dispense Refill    loperamide (IMODIUM A-D) 2 MG tablet Take 1 tablet by mouth 4 times daily as needed for Diarrhea      LORazepam (ATIVAN) 0.5 MG tablet Take 1 tablet by mouth daily as needed for Anxiety. Max Daily Amount: 0.5 mg      lisinopril (PRINIVIL;ZESTRIL) 40 MG tablet Take 0.5 tablets by mouth daily (Patient taking differently: Take 0.5 tablets by mouth daily Indications: High Blood Pressure Disorder) 30 tablet 3    amLODIPine (NORVASC) 5 MG tablet Take 1 tablet by mouth daily (Patient taking differently: Take 1 tablet by mouth daily Indications: High Blood Pressure Disorder) 30 tablet 3    nebivolol (BYSTOLIC) 10 MG tablet Take 1 tablet by mouth daily (Patient taking differently: Take 1 tablet by mouth daily Indications: High Blood Pressure Disorder) 30 tablet 3    acetaminophen (TYLENOL) 325 MG tablet Take 2 tablets by mouth 3 times daily      aspirin 81 MG tablet Take 1 tablet by mouth daily Indications: Thickening and Hardening of Heart Arteries      atorvastatin (LIPITOR) 10 MG tablet Take 1 tablet by mouth daily Indications: High Amount of Fats in the Blood      Calcium 600-200 MG-UNIT TABS Take 1 tablet by mouth daily Indications: Nutritional Support      Multiple Vitamins-Minerals (CENTRUM SILVER) TABS Take 1 tablet by mouth daily

## 2025-06-06 NOTE — PROGRESS NOTES
SUBJECTIVE:  89-year-old woman seen in follow-up for dysphagia hypertension dementia acute psychosis globally coughing intermittently      ROS: Limited by cognitive  The rest of the 14 point ROS negative    PHYSICAL EXAM: VSS per facility record  Alert to self oral mucosa is moist chest no crackles abdomen soft tender EXTR trace edema no rash    ASSESSMENT & PLAN:   Diagnosis Orders   1. Dysphagia, unspecified type        2. Hypertension, unspecified type        3. Late onset Alzheimer's disease without behavioral disturbance (HCC)          Has been on ACE inhibitor along with Norvasc no regimen.  Has been donepezil consider titration of the low-dose medication time.  Alysa on Bystolic along with lisinopril.  Functionally weak.  Has been Synthroid repeat TSH pending.  Has been on Namenda            Past Medical History:   Diagnosis Date    Alzheimer's dementia     Anxiety     CAD (coronary artery disease)     Dementia     Hyperlipidemia     Hypertension     Hypothyroidism     Melanoma (HCC)     skin    Osteoarthritis          Past Surgical History:   Procedure Laterality Date    BACK SURGERY      BREAST SURGERY      biopsy    CORONARY ANGIOPLASTY WITH STENT PLACEMENT      HYSTERECTOMY  1960         Current Outpatient Medications on File Prior to Visit   Medication Sig Dispense Refill    loperamide (IMODIUM A-D) 2 MG tablet Take 1 tablet by mouth 4 times daily as needed for Diarrhea      LORazepam (ATIVAN) 0.5 MG tablet Take 1 tablet by mouth daily as needed for Anxiety. Max Daily Amount: 0.5 mg      lisinopril (PRINIVIL;ZESTRIL) 40 MG tablet Take 0.5 tablets by mouth daily (Patient taking differently: Take 0.5 tablets by mouth daily Indications: High Blood Pressure Disorder) 30 tablet 3    amLODIPine (NORVASC) 5 MG tablet Take 1 tablet by mouth daily (Patient taking differently: Take 1 tablet by mouth daily Indications: High Blood Pressure Disorder) 30 tablet 3    nebivolol (BYSTOLIC) 10 MG tablet Take 1 tablet by

## 2025-07-10 ENCOUNTER — OFFICE VISIT (OUTPATIENT)
Dept: GERIATRIC MEDICINE | Age: 89
End: 2025-07-10

## 2025-07-10 DIAGNOSIS — I10 PRIMARY HYPERTENSION: Primary | ICD-10-CM

## 2025-07-10 DIAGNOSIS — F02.80 LATE ONSET ALZHEIMER'S DISEASE WITHOUT BEHAVIORAL DISTURBANCE (HCC): ICD-10-CM

## 2025-07-10 DIAGNOSIS — G30.1 LATE ONSET ALZHEIMER'S DISEASE WITHOUT BEHAVIORAL DISTURBANCE (HCC): ICD-10-CM

## 2025-07-10 DIAGNOSIS — M15.9 OSTEOARTHRITIS OF MULTIPLE JOINTS, UNSPECIFIED OSTEOARTHRITIS TYPE: ICD-10-CM
